# Patient Record
Sex: MALE | Race: WHITE | Employment: FULL TIME | URBAN - METROPOLITAN AREA
[De-identification: names, ages, dates, MRNs, and addresses within clinical notes are randomized per-mention and may not be internally consistent; named-entity substitution may affect disease eponyms.]

---

## 2018-04-23 PROBLEM — J45.20 MILD INTERMITTENT ASTHMA WITHOUT COMPLICATION: Status: ACTIVE | Noted: 2018-04-23

## 2018-04-23 PROBLEM — F41.1 GENERALIZED ANXIETY DISORDER: Status: ACTIVE | Noted: 2018-04-23

## 2018-04-23 PROBLEM — F41.0 PANIC ATTACKS: Status: ACTIVE | Noted: 2018-04-23

## 2018-04-23 PROBLEM — F33.1 MODERATE EPISODE OF RECURRENT MAJOR DEPRESSIVE DISORDER (HCC): Status: ACTIVE | Noted: 2018-04-23

## 2018-04-23 PROBLEM — J45.20 MILD INTERMITTENT ASTHMA WITHOUT COMPLICATION (HCC): Status: ACTIVE | Noted: 2018-04-23

## 2018-04-24 PROCEDURE — 82746 ASSAY OF FOLIC ACID SERUM: CPT | Performed by: PEDIATRICS

## 2018-04-24 PROCEDURE — 84480 ASSAY TRIIODOTHYRONINE (T3): CPT | Performed by: PEDIATRICS

## 2018-04-24 PROCEDURE — 82607 VITAMIN B-12: CPT | Performed by: PEDIATRICS

## 2024-07-12 ENCOUNTER — APPOINTMENT (OUTPATIENT)
Dept: GENERAL RADIOLOGY | Facility: HOSPITAL | Age: 54
End: 2024-07-12
Attending: EMERGENCY MEDICINE
Payer: COMMERCIAL

## 2024-07-12 ENCOUNTER — HOSPITAL ENCOUNTER (EMERGENCY)
Facility: HOSPITAL | Age: 54
Discharge: HOME | End: 2024-07-12
Attending: EMERGENCY MEDICINE
Payer: COMMERCIAL

## 2024-07-12 VITALS
DIASTOLIC BLOOD PRESSURE: 89 MMHG | HEIGHT: 68 IN | TEMPERATURE: 97 F | SYSTOLIC BLOOD PRESSURE: 142 MMHG | BODY MASS INDEX: 23.49 KG/M2 | WEIGHT: 155 LBS | RESPIRATION RATE: 22 BRPM | OXYGEN SATURATION: 98 % | HEART RATE: 65 BPM

## 2024-07-12 DIAGNOSIS — J04.0 ACUTE LARYNGITIS: Primary | ICD-10-CM

## 2024-07-12 LAB — S PYO AG THROAT QL: NEGATIVE

## 2024-07-12 PROCEDURE — 70360 X-RAY EXAM OF NECK: CPT | Performed by: EMERGENCY MEDICINE

## 2024-07-12 PROCEDURE — 99284 EMERGENCY DEPT VISIT MOD MDM: CPT

## 2024-07-12 PROCEDURE — 87880 STREP A ASSAY W/OPTIC: CPT

## 2024-07-12 PROCEDURE — 99283 EMERGENCY DEPT VISIT LOW MDM: CPT

## 2024-07-12 RX ORDER — LIDOCAINE HYDROCHLORIDE 20 MG/ML
5 SOLUTION OROPHARYNGEAL EVERY 6 HOURS PRN
Qty: 40 ML | Refills: 0 | Status: SHIPPED | OUTPATIENT
Start: 2024-07-12

## 2024-07-12 NOTE — ED INITIAL ASSESSMENT (HPI)
Covid since last Thursday, and for 2 days c/o trouble swallowing and subsequent trouble breathing. Vocalizing clearly, respirations unlabored, tolerating secretions, VSS

## 2024-07-12 NOTE — ED PROVIDER NOTES
Patient Seen in: [facility] Emergency Department      History     Chief Complaint   Patient presents with   • Covid     Stated Complaint: covid (says hes choking)    Subjective:   HPI    53-year-old male with COVID over a week ago improving who was given Paxlovid but then developed a rash and stopped it has had worsening throat pain slightly worse on the right than the left and some change in voice and pain and difficulty swallowing over the last few days.  No fever.  Cough and headache and myalgia as well as weakness symptoms have improved.    Objective:   No pertinent past medical history.            No pertinent past surgical history.              No pertinent social history.            Review of Systems    Positive for stated Chief Complaint: Covid    Other systems are as noted in HPI.  Constitutional and vital signs reviewed.      All other systems reviewed and negative except as noted above.    Physical Exam     ED Triage Vitals [07/12/24 0204]   BP (!) 145/92   Pulse 62   Resp 20   Temp 97.2 °F (36.2 °C)   Temp src    SpO2 99 %   O2 Device None (Room air)       Current Vitals:   Vital Signs  BP: (!) 145/92  Pulse: 62  Resp: 20  Temp: 97.2 °F (36.2 °C)  MAP (mmHg): (!) 110    Oxygen Therapy  SpO2: 99 %  O2 Device: None (Room air)            Physical Exam    Constitutional: Oriented to person, place, and time.  Appears well-developed. No distress.   Head: Normocephalic and atraumatic.   Eyes: Conjunctivae are normal. Pupils are equal, round, and reactive to light.   ENT: Posterior pharyngeal erythema.  No peritonsillar abscess or exudates.  Neck: Normal range of motion. Neck supple.  No significant lymphadenopathy.  No mass.  No stridor.  Slight hoarse voice.  Cardiovascular: Normal rate, regular rhythm and intact distal pulses.    Pulmonary/Chest: Effort normal. No respiratory distress.  No wheeze or crackles.  Musculoskeletal: Normal range of motion. No edema or tenderness.   Neurological: Alert and  oriented to person, place, and time.   Skin: Skin is warm and dry.   Nursing note and vitals reviewed.    Differential diagnosis includes laryngitis, postviral syndrome, less likely epiglottic process or retropharyngeal process.      ED Course     Labs Reviewed   POCT RAPID STREP - Normal               Neck soft tissue 2 view      IMPRESSION:    Patent upper airway  No epiglottitis    Faxed/finalized only at 709 hrs ET. If you are a member of the clinical care team and would like to discuss this case directly please call [phone] (ext [phone]).    Karlos Keating M.D.           [facility]                                         Medical Decision Making  Patient looks clinically well.  Given a dose of Decadron.  Will go on Lortab elixir.  Motrin at home for pain as well.  X-ray imaging reassuring.  Vital stable.  No indication for admission.  Will follow-up and come back with worsening or change.    Problems Addressed:  Acute laryngitis: acute illness or injury    Amount and/or Complexity of Data Reviewed  Labs: ordered. Decision-making details documented in ED Course.  Radiology: ordered and independent interpretation performed. Decision-making details documented in ED Course.     Details: By my gross review of the soft tissue neck x-ray I do not appreciate gross and obvious evidence of epiglottic enlargement or obvious retropharyngeal tissue enlargement    Risk  OTC drugs.  Prescription drug management.        Disposition and Plan     Clinical Impression:  1. Acute laryngitis         Disposition:  Discharge  7/12/2024  6:16 am    Follow-up:  Robinson Torres MD  [address] [place] [address]  [address] [place] [id number]  [phone]    Call today            Medications Prescribed:  Current Discharge Medication List        START taking these medications    Details   HYDROcodone-acetaminophen 7.5-325 MG/15ML Oral Solution Take 10 mL by mouth every 6 (six) hours as needed for Pain.  Qty: 80 mL, Refills: 0    Associated Diagnoses: Acute  laryngitis      Lidocaine Viscous HCl 2 % Mouth/Throat Solution Take 5 mL by mouth every 6 (six) hours as needed for Pain.  Qty: 40 mL, Refills: 0

## 2024-09-22 ENCOUNTER — HOSPITAL ENCOUNTER (INPATIENT)
Facility: HOSPITAL | Age: 54
LOS: 1 days | Discharge: HOME | End: 2024-09-24
Attending: EMERGENCY MEDICINE | Admitting: STUDENT IN AN ORGANIZED HEALTH CARE EDUCATION/TRAINING PROGRAM
Payer: COMMERCIAL

## 2024-09-22 ENCOUNTER — APPOINTMENT (OUTPATIENT)
Dept: CT IMAGING | Facility: HOSPITAL | Age: 54
End: 2024-09-22
Payer: COMMERCIAL

## 2024-09-22 ENCOUNTER — APPOINTMENT (OUTPATIENT)
Dept: CT IMAGING | Facility: HOSPITAL | Age: 54
End: 2024-09-22
Attending: EMERGENCY MEDICINE
Payer: COMMERCIAL

## 2024-09-22 DIAGNOSIS — F31.9 BIPOLAR 1 DISORDER (HCC): ICD-10-CM

## 2024-09-22 DIAGNOSIS — F41.9 ANXIETY: ICD-10-CM

## 2024-09-22 DIAGNOSIS — I63.9 ACUTE CVA (CEREBROVASCULAR ACCIDENT) (HCC): Primary | ICD-10-CM

## 2024-09-22 LAB
BASOPHILS # BLD AUTO: 0.03 X10(3) UL (ref 0–0.2)
BASOPHILS NFR BLD AUTO: 0.5 %
DEPRECATED RDW RBC AUTO: 39.8 FL (ref 35.1–46.3)
EOSINOPHIL # BLD AUTO: 0.16 X10(3) UL (ref 0–0.7)
EOSINOPHIL NFR BLD AUTO: 2.8 %
ERYTHROCYTE [DISTWIDTH] IN BLOOD BY AUTOMATED COUNT: 12 % (ref 11–15)
GLUCOSE BLDC GLUCOMTR-MCNC: 112 MG/DL (ref 70–99)
HCT VFR BLD AUTO: 42.7 %
HGB BLD-MCNC: 15.4 G/DL
IMM GRANULOCYTES # BLD AUTO: 0.01 X10(3) UL (ref 0–1)
IMM GRANULOCYTES NFR BLD: 0.2 %
LYMPHOCYTES # BLD AUTO: 2.89 X10(3) UL (ref 1–4)
LYMPHOCYTES NFR BLD AUTO: 51 %
MCH RBC QN AUTO: 33 PG (ref 26–34)
MCHC RBC AUTO-ENTMCNC: 36.1 G/DL (ref 31–37)
MCV RBC AUTO: 91.4 FL
MONOCYTES # BLD AUTO: 0.47 X10(3) UL (ref 0.1–1)
MONOCYTES NFR BLD AUTO: 8.3 %
NEUTROPHILS # BLD AUTO: 2.11 X10 (3) UL (ref 1.5–7.7)
NEUTROPHILS # BLD AUTO: 2.11 X10(3) UL (ref 1.5–7.7)
NEUTROPHILS NFR BLD AUTO: 37.2 %
PLATELET # BLD AUTO: 197 10(3)UL (ref 150–450)
RBC # BLD AUTO: 4.67 X10(6)UL
WBC # BLD AUTO: 5.7 X10(3) UL (ref 4–11)

## 2024-09-22 PROCEDURE — 70496 CT ANGIOGRAPHY HEAD: CPT | Performed by: EMERGENCY MEDICINE

## 2024-09-22 PROCEDURE — 70498 CT ANGIOGRAPHY NECK: CPT | Performed by: EMERGENCY MEDICINE

## 2024-09-22 PROCEDURE — 70450 CT HEAD/BRAIN W/O DYE: CPT | Performed by: EMERGENCY MEDICINE

## 2024-09-23 ENCOUNTER — APPOINTMENT (OUTPATIENT)
Dept: CT IMAGING | Facility: HOSPITAL | Age: 54
End: 2024-09-23
Attending: EMERGENCY MEDICINE
Payer: COMMERCIAL

## 2024-09-23 ENCOUNTER — APPOINTMENT (OUTPATIENT)
Dept: CV DIAGNOSTICS | Facility: HOSPITAL | Age: 54
End: 2024-09-23
Attending: HOSPITALIST
Payer: COMMERCIAL

## 2024-09-23 ENCOUNTER — APPOINTMENT (OUTPATIENT)
Dept: CT IMAGING | Facility: HOSPITAL | Age: 54
End: 2024-09-23
Attending: Other
Payer: COMMERCIAL

## 2024-09-23 ENCOUNTER — APPOINTMENT (OUTPATIENT)
Dept: MRI IMAGING | Facility: HOSPITAL | Age: 54
End: 2024-09-23
Attending: HOSPITALIST
Payer: COMMERCIAL

## 2024-09-23 PROBLEM — I63.9 ACUTE CVA (CEREBROVASCULAR ACCIDENT) (HCC): Status: ACTIVE | Noted: 2024-09-23

## 2024-09-23 LAB
ALBUMIN SERPL-MCNC: 4.6 G/DL (ref 3.2–4.8)
ALBUMIN/GLOB SERPL: 1.8 {RATIO} (ref 1–2)
ALP LIVER SERPL-CCNC: 67 U/L
ALT SERPL-CCNC: 17 U/L
ANION GAP SERPL CALC-SCNC: 7 MMOL/L (ref 0–18)
ANTIBODY SCREEN: NEGATIVE
APTT PPP: 29 SECONDS (ref 23–36)
AST SERPL-CCNC: 16 U/L (ref ?–34)
ATRIAL RATE: 71 BPM
BILIRUB SERPL-MCNC: 0.6 MG/DL (ref 0.3–1.2)
BUN BLD-MCNC: 15 MG/DL (ref 9–23)
BUN/CREAT SERPL: 9.5 (ref 10–20)
CALCIUM BLD-MCNC: 9.6 MG/DL (ref 8.7–10.4)
CHLORIDE SERPL-SCNC: 106 MMOL/L (ref 98–112)
CHOLEST SERPL-MCNC: 219 MG/DL (ref ?–200)
CO2 SERPL-SCNC: 28 MMOL/L (ref 21–32)
CREAT BLD-MCNC: 1.58 MG/DL
EGFRCR SERPLBLD CKD-EPI 2021: 51 ML/MIN/1.73M2 (ref 60–?)
EST. AVERAGE GLUCOSE BLD GHB EST-MCNC: 105 MG/DL (ref 68–126)
GLOBULIN PLAS-MCNC: 2.5 G/DL (ref 2–3.5)
GLUCOSE BLD-MCNC: 98 MG/DL (ref 70–99)
HBA1C MFR BLD: 5.3 % (ref ?–5.7)
HDLC SERPL-MCNC: 55 MG/DL (ref 40–59)
INR BLD: 0.96 (ref 0.8–1.2)
LDLC SERPL CALC-MCNC: 147 MG/DL (ref ?–100)
MAGNESIUM SERPL-MCNC: 1.9 MG/DL (ref 1.6–2.6)
NONHDLC SERPL-MCNC: 164 MG/DL (ref ?–130)
OSMOLALITY SERPL CALC.SUM OF ELEC: 293 MOSM/KG (ref 275–295)
P AXIS: 55 DEGREES
P-R INTERVAL: 148 MS
POTASSIUM SERPL-SCNC: 3.7 MMOL/L (ref 3.5–5.1)
PROT SERPL-MCNC: 7.1 G/DL (ref 5.7–8.2)
PROTHROMBIN TIME: 13.3 SECONDS (ref 11.6–14.8)
Q-T INTERVAL: 400 MS
QRS DURATION: 94 MS
QTC CALCULATION (BEZET): 434 MS
R AXIS: 10 DEGREES
RH BLOOD TYPE: NEGATIVE
RH BLOOD TYPE: NEGATIVE
SARS-COV-2 RNA RESP QL NAA+PROBE: NOT DETECTED
SODIUM SERPL-SCNC: 141 MMOL/L (ref 136–145)
T AXIS: 40 DEGREES
TRIGL SERPL-MCNC: 94 MG/DL (ref 30–149)
TROPONIN I SERPL HS-MCNC: 21 NG/L
VENTRICULAR RATE: 71 BPM
VLDLC SERPL CALC-MCNC: 18 MG/DL (ref 0–30)

## 2024-09-23 PROCEDURE — 93306 TTE W/DOPPLER COMPLETE: CPT | Performed by: HOSPITALIST

## 2024-09-23 PROCEDURE — 3E03317 INTRODUCTION OF OTHER THROMBOLYTIC INTO PERIPHERAL VEIN, PERCUTANEOUS APPROACH: ICD-10-PCS | Performed by: EMERGENCY MEDICINE

## 2024-09-23 PROCEDURE — 70450 CT HEAD/BRAIN W/O DYE: CPT | Performed by: EMERGENCY MEDICINE

## 2024-09-23 PROCEDURE — 70551 MRI BRAIN STEM W/O DYE: CPT | Performed by: HOSPITALIST

## 2024-09-23 RX ORDER — GUAIFENESIN 600 MG/1
600 TABLET, EXTENDED RELEASE ORAL DAILY
Status: ON HOLD | COMMUNITY
End: 2024-09-24 | Stop reason: CLARIF

## 2024-09-23 RX ORDER — ACETAMINOPHEN 325 MG/1
650 TABLET ORAL EVERY 4 HOURS PRN
Status: DISCONTINUED | OUTPATIENT
Start: 2024-09-23 | End: 2024-09-24

## 2024-09-23 RX ORDER — DIPHENHYDRAMINE HYDROCHLORIDE 50 MG/ML
50 INJECTION INTRAMUSCULAR; INTRAVENOUS ONCE AS NEEDED
Status: ACTIVE | OUTPATIENT
Start: 2024-09-23 | End: 2024-09-23

## 2024-09-23 RX ORDER — SODIUM CHLORIDE 9 MG/ML
INJECTION, SOLUTION INTRAVENOUS CONTINUOUS
Status: DISCONTINUED | OUTPATIENT
Start: 2024-09-23 | End: 2024-09-23

## 2024-09-23 RX ORDER — ATORVASTATIN CALCIUM 80 MG/1
80 TABLET, FILM COATED ORAL NIGHTLY
Status: DISCONTINUED | OUTPATIENT
Start: 2024-09-23 | End: 2024-09-24

## 2024-09-23 RX ORDER — METHYLPREDNISOLONE SODIUM SUCCINATE 125 MG/2ML
125 INJECTION, POWDER, LYOPHILIZED, FOR SOLUTION INTRAMUSCULAR; INTRAVENOUS ONCE AS NEEDED
Status: ACTIVE | OUTPATIENT
Start: 2024-09-23 | End: 2024-09-23

## 2024-09-23 RX ORDER — LABETALOL HYDROCHLORIDE 5 MG/ML
10 INJECTION, SOLUTION INTRAVENOUS EVERY 10 MIN PRN
Status: DISCONTINUED | OUTPATIENT
Start: 2024-09-23 | End: 2024-09-24

## 2024-09-23 RX ORDER — LAMOTRIGINE 200 MG/1
400 TABLET ORAL DAILY
Status: DISCONTINUED | OUTPATIENT
Start: 2024-09-23 | End: 2024-09-24

## 2024-09-23 RX ORDER — ONDANSETRON 2 MG/ML
4 INJECTION INTRAMUSCULAR; INTRAVENOUS EVERY 6 HOURS PRN
Status: DISCONTINUED | OUTPATIENT
Start: 2024-09-23 | End: 2024-09-24

## 2024-09-23 RX ORDER — FAMOTIDINE 10 MG/ML
20 INJECTION, SOLUTION INTRAVENOUS ONCE AS NEEDED
Status: ACTIVE | OUTPATIENT
Start: 2024-09-23 | End: 2024-09-23

## 2024-09-23 RX ORDER — POTASSIUM CHLORIDE 1500 MG/1
40 TABLET, EXTENDED RELEASE ORAL ONCE
Status: COMPLETED | OUTPATIENT
Start: 2024-09-23 | End: 2024-09-23

## 2024-09-23 NOTE — ED INITIAL ASSESSMENT (HPI)
Pt to ED with c/o left side numbness, fatigue, and left side facial droop , onset of symptoms at 11pm..Speech changes noted during evaluation.  MD Begum at bedside.      Pt states onset of left check twitching x3 days.

## 2024-09-23 NOTE — CONSULTS
[facility], [facility]    Huber CHRISS Alicea Patient Status:  Inpatient    1970 MRN [id number]   Location 234/234-A PCP Robinson Lopez MD     Date of Admission: 2024    History of Present Illness: Pt is a 54 year old male consulted for CC management s/p TNK with h/o bipolar diosrder and ADHD. Presented with onset of L facia droop and Larm weakness 2300 on . CT head negative for bleed and TNK given 0030 . Pt notes severe stress over the last week and notes weakness/malaise yesterday. Was very tired as well. Noted onset of L sided facial droop and weakness prompted ED eval.   Feels improved now but with chest tightness dissimilar to asthma. Notes some R facial tingling.   No antecdedent palpitations or chest pain.   Does admit to snoring and daytime sleepiness.   Does admit to reynauds as well, with swallow difficulty that prededed event and telangietasis.   PMH:    Past Medical History:    Asthma (HCC)    childhood    Bipolar 2 disorder (HCC)    Cubital tunnel syndrome    Heart murmur    History of Raynaud's syndrome    Low vitamin D level    profoundly low per patient       Past Surgical History:   Procedure Laterality Date    Other      cubital tunnel release    Other      L index finger gangion removal       Allergies:   Allergies   Allergen Reactions    Mold OTHER (SEE COMMENTS)    Penicillins OTHER (SEE COMMENTS)     Told by mother       Medications:  Outpatient Medications:    Current Facility-Administered Medications on File Prior to Encounter   Medication Dose Route Frequency Provider Last Rate Last Admin    [COMPLETED] dexamethasone (Decadron) tab 10 mg  10 mg Oral Once Nguyễn Rivera MD   10 mg at 24 0518     Current Outpatient Medications on File Prior to Encounter   Medication Sig Dispense Refill    guaiFENesin  MG Oral Tablet 12 Hr Take 1 tablet (600 mg total) by mouth daily.      lamoTRIgine 200 MG Oral Tab Take 2 tablets (400 mg total) by mouth  daily.      amphetamine-dextroamphetamine 20 MG Oral Tab Take 1 tablet by mouth daily.      Lidocaine Viscous HCl 2 % Mouth/Throat Solution Take 5 mL by mouth every 6 (six) hours as needed for Pain. 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    TB: none  Asbestos: none      Family History   Problem Relation Age of Onset    Stroke Father 67      REVIEW OF SYSTEMS:   A comprehensive 10 point review of systems was completed.  Pertinent positives and negatives noted in the the HPI.    OBJECTIVE:  Temp (24hrs), Av.9 °F (36.6 °C), Min:97.7 °F (36.5 °C), Max:98.1 °F (36.7 °C)   /78 (BP Location: Right arm)   Pulse 66   Temp 97.7 °F (36.5 °C) (Temporal)   Resp 21   Ht 172.7 cm (5' 7.99\")   Wt 161 lb 9.6 oz (73.3 kg)   SpO2 97%   BMI 24.58 kg/m²   O2: RA  General: NAD.   Neuro: Alert, no focal deficits.    HEENT: PERRL, class 3 airway.   Neck : No LAD  CV: RRR, nl S1, S2, no S4, S3 or murmur.   Lungs: Clear bilaterally.   Abd: Nontender, non distended.   Ext: No edema.   Skin: No rashes.     Labs:  Recent Labs   Lab 24  2350   WBC 5.7   HGB 15.4   HCT 42.7   .0     Recent Labs   Lab 24  2350   GLU 98   BUN 15   CREATSERUM 1.58*   CA 9.6      K 3.7      CO2 28.0   AST 16   ALT 17   ALB 4.6     Recent Labs   Lab 24  2350   INR 0.96   PTT 29.0     No results for input(s): \"ABGPHT\",  \"SNZGEB3N\", \"SYKFO5K\", \"ABGHCO3\", \"SITE\", \"DEV\", \"THGB\" in the last 168 hours.  Recent Labs   Lab 09/22/24  2350   TROPHS 21     No results for input(s): \"BNP\" in the last 168 hours.  COVID-19 Lab Results    COVID-19  Lab Results   Component Value Date    COVID19 Not Detected 09/23/2024    COVID19 NOT DETECTED 12/21/2021    COVID19 Not Detected 04/29/2020       Pro-Calcitonin  No results for input(s): \"PCT\" in the last 168 hours.    Cardiac  No results for input(s): \"TROP\", \"PBNP\" in the last 168 hours.    Creatinine Kinase  No results for input(s): \"CK\" in the last 168 hours.    Inflammatory Markers  No results for input(s): \"CRP\", \"GILBERTO\", \"LDH\", \"DDIMER\" in the last 168 hours.        Imaging:  CT head 9/22:  1.  No acute intracranial process.   2.  There are mild microvascular white matter ischemic changes, likely related to long-standing hypertension and/or diabetes.   3.  Atherosclerosis in the anterior and posterior circulations.     Assessment/Plan   CVA L sided weakness s/p TNK 9/23/2024  Unclear etiology but has sedentary job and telangietasis.   ?AVM  Echo with bubble study pending.   Neuro following.   Check MEIR and Anti SCL 70, ?CREST syndrome with esophageal issues.   Speech eval.   Tele  Neuro checks  Snoring  Suspect RICKIE  Outpt PSG especially with CVA.   Nutrtion Advance diet.   Proph  SCD. Lovenox when Ok with neuro.   Bipolar   Continue meds.   Dispo  Full code. ICU monitoring.     Critical Care Time greater than: 35 minutes    My best regards,         Nelson Pepe MD  [facility] Pulmonary, Critical Care and Sleep Medicine

## 2024-09-23 NOTE — PLAN OF CARE
Problem: HEMATOLOGIC - ADULT  Goal: Free from bleeding injury  Description: (Example usage: patient with low platelets)  INTERVENTIONS:  - Avoid intramuscular injections, enemas and rectal medication administration  - Ensure safe mobilization of patient  - Hold pressure on venipuncture sites to achieve adequate hemostasis  - Assess for signs and symptoms of internal bleeding  - Monitor lab trends  - Patient is to report abnormal signs of bleeding to staff  - Avoid use of toothpicks and dental floss  - Use electric shaver for shaving  - Use soft bristle tooth brush  - Limit straining and forceful nose blowing  Outcome: Progressing     Problem: Patient Centered Care  Goal: Patient preferences are identified and integrated in the patient's plan of care  Description: Interventions:  - What would you like us to know as we care for you? The patient normally has a low resting heart rate  - Provide timely, complete, and accurate information to patient/family  - Incorporate patient and family knowledge, values, beliefs, and cultural backgrounds into the planning and delivery of care  - Encourage patient/family to participate in care and decision-making at the level they choose  - Honor patient and family perspectives and choices  Outcome: Progressing     Problem: Patient/Family Goals  Goal: Patient/Family Long Term Goal  Description: Patient's Long Term Goal: Discharge home    Interventions:  - Medications as ordered  - Tests and procedures as ordered  - PT/OT as ordered  - SLP as ordered  - See additional Care Plan goals for specific interventions  Outcome: Progressing  Goal: Patient/Family Short Term Goal  Description: Patient's Short Term Goal: Increased Mobility    Interventions:   - Medications as ordered  - Tests and procedures as ordered  - PT/OT as ordered  - See additional Care Plan goals for specific interventions  Outcome: Progressing     Problem: METABOLIC/FLUID AND ELECTROLYTES - ADULT  Goal: Glucose  maintained within prescribed range  Description: INTERVENTIONS:  - Monitor Blood Glucose as ordered  - Assess for signs and symptoms of hyperglycemia and hypoglycemia  - Administer ordered medications to maintain glucose within target range  - Assess barriers to adequate nutritional intake and initiate nutrition consult as needed  - Instruct patient on self management of diabetes  Outcome: Progressing  Goal: Electrolytes maintained within normal limits  Description: INTERVENTIONS:  - Monitor labs and rhythm and assess patient for signs and symptoms of electrolyte imbalances  - Administer electrolyte replacement as ordered  - Monitor response to electrolyte replacements, including rhythm and repeat lab results as appropriate  - Fluid restriction as ordered  - Instruct patient on fluid and nutrition restrictions as appropriate  Outcome: Progressing  Goal: Hemodynamic stability and optimal renal function maintained  Description: INTERVENTIONS:  - Monitor labs and assess for signs and symptoms of volume excess or deficit  - Monitor intake, output and patient weight  - Monitor urine specific gravity, serum osmolarity and serum sodium as indicated or ordered  - Monitor response to interventions for patient's volume status, including labs, urine output, blood pressure (other measures as available)  - Encourage oral intake as appropriate  - Instruct patient on fluid and nutrition restrictions as appropriate  Outcome: Progressing     Problem: SKIN/TISSUE INTEGRITY - ADULT  Goal: Skin integrity remains intact  Description: INTERVENTIONS  - Assess and document risk factors for pressure ulcer development  - Assess and document skin integrity  - Monitor for areas of redness and/or skin breakdown  - Initiate interventions, skin care algorithm/standards of care as needed  Outcome: Progressing  Goal: Oral mucous membranes remain intact  Description: INTERVENTIONS  - Assess oral mucosa and hygiene practices  - Implement preventative  oral hygiene regimen  - Implement oral medicated treatments as ordered  Outcome: Progressing     Problem: NEUROLOGICAL - ADULT  Goal: Achieves stable or improved neurological status  Description: INTERVENTIONS  - Assess for and report changes in neurological status  - Initiate measures to prevent increased intracranial pressure  - Maintain blood pressure and fluid volume within ordered parameters to optimize cerebral perfusion and minimize risk of hemorrhage  - Monitor temperature, glucose, and sodium. Initiate appropriate interventions as ordered  Outcome: Progressing  Goal: Absence of seizures  Description: INTERVENTIONS  - Monitor for seizure activity  - Administer anti-seizure medications as ordered  - Monitor neurological status  Outcome: Progressing  Goal: Remains free of injury related to seizure activity  Description: INTERVENTIONS:  - Maintain airway, patient safety  and administer oxygen as ordered  - Monitor patient for seizure activity, document and report duration and description of seizure to MD/LIP  - If seizure occurs, turn patient to side and suction secretions as needed  - Reorient patient post seizure  - Seizure pads on all 4 side rails  - Instruct patient/family to notify RN of any seizure activity  - Instruct patient/family to call for assistance with activity based on assessment  Outcome: Progressing  Goal: Achieves maximal functionality and self care  Description: INTERVENTIONS  - Monitor swallowing and airway patency with patient fatigue and changes in neurological status  - Encourage and assist patient to increase activity and self care with guidance from PT/OT  - Encourage visually impaired, hearing impaired and aphasic patients to use assistive/communication devices  Outcome: Progressing     Problem: PAIN - ADULT  Goal: Verbalizes/displays adequate comfort level or patient's stated pain goal  Description: INTERVENTIONS:  - Encourage pt to monitor pain and request assistance  - Assess pain  using appropriate pain scale  - Administer analgesics based on type and severity of pain and evaluate response  - Implement non-pharmacological measures as appropriate and evaluate response  - Consider cultural and social influences on pain and pain management  - Manage/alleviate anxiety  - Utilize distraction and/or relaxation techniques  - Monitor for opioid side effects  - Notify MD/LIP if interventions unsuccessful or patient reports new pain  - Anticipate increased pain with activity and pre-medicate as appropriate  Outcome: Progressing     Problem: SAFETY ADULT - FALL  Goal: Free from fall injury  Description: INTERVENTIONS:  - Assess pt frequently for physical needs  - Identify cognitive and physical deficits and behaviors that affect risk of falls.  - [place] fall precautions as indicated by assessment.  - Educate pt/family on patient safety including physical limitations  - Instruct pt to call for assistance with activity based on assessment  - Modify environment to reduce risk of injury  - Provide assistive devices as appropriate  - Consider OT/PT consult to assist with strengthening/mobility  - Encourage toileting schedule  Outcome: Progressing     Problem: DISCHARGE PLANNING  Goal: Discharge to home or other facility with appropriate resources  Description: INTERVENTIONS:  - Identify barriers to discharge w/pt and caregiver  - Include patient/family/discharge partner in discharge planning  - Arrange for needed discharge resources and transportation as appropriate  - Identify discharge learning needs (meds, wound care, etc)  - Arrange for interpreters to assist at discharge as needed  - Consider post-discharge preferences of patient/family/discharge partner  - Complete POLST form as appropriate  - Assess patient's ability to be responsible for managing their own health  - Refer to Case Management Department for coordinating discharge planning if the patient needs post-hospital services based on  physician/LIP order or complex needs related to functional status, cognitive ability or social support system  Outcome: Progressing

## 2024-09-23 NOTE — ED QUICK NOTES
Delayed charting due to direct patient care  Covering for primary nurse.  MD at bedside for reevaluation. Pt now complaining of some numbness to face and tingling on the right side of his nose. MD placed order for repeat imaging. Transported with this RN and monitor to CT and back. Denies any arm or leg numbness or tingling.

## 2024-09-23 NOTE — SPIRITUAL CARE NOTE
Spiritual Care Visit Note    Patient Name: Huber Alicea Date of Spiritual Care Visit: 24   : 1970 Primary Dx: <principal problem not specified>       Referred By: Referral From: Nurse    Spiritual Care Taxonomy:    Intended Effects: Convey a calming presence    Methods: Offer spiritual/Religion support;Offer support;Encourage sharing of feelings    Interventions: Acknowledge current situation;Active listening;Ask guided questions;Discuss concerns;Explain  role;Identify supportive relationship(s);Silent prayer;Share words of hope and inspiration    Visit Type/Summary:     - Spiritual Care: Responded to a request for spiritual care and assessed the patient for spiritual care needs. Patient and family expressed appreciation for  visit. Provided information regarding how to contact Spiritual Care and left a Spiritual Care information card. Provided support for Patient's spiritual/Religion requests.  remains available for follow up.    Spiritual Care support can be requested via an Epic consult. For urgent/immediate needs, please contact the On Call  at: [place]: ext [phone]    Rev Annalsia Franco

## 2024-09-23 NOTE — CM/SW NOTE
09/23/24 1800   CM/SW Referral Data   Referral Source Physician   Reason for Referral Discharge planning   Informant Patient   Medical Hx   Does patient have an established PCP? Yes   Patient Info   Patient lives with Spouse/Significant other   Patient Status Prior to Admission   Independent with ADLs and Mobility Yes   Discharge Needs   Anticipated D/C needs No anticipated discharge needs     PT and OT saw patient today.  No HH recommendations.    PLAN:  [place] wife at [place].    Monika DE LEONN RN CRRN   RN Case Manager  [phone]

## 2024-09-23 NOTE — OCCUPATIONAL THERAPY NOTE
OCCUPATIONAL THERAPY EVALUATION - INPATIENT     Room Number: 234/234-A  Evaluation Date: 2024  Type of Evaluation: Quick Eval  Presenting Problem: L side n/t, L facial droop, expressive aphasia  CT brain : (-) for acute findings. MRI brain pending.  Hx bipolar, cubital tunnel syndrome     Physician Order: IP Consult to Occupational Therapy  Reason for Therapy: ADL/IADL Dysfunction and Discharge Planning    OCCUPATIONAL THERAPY ASSESSMENT   Patient is a 54 year old male admitted 2024 for L side n/t, L facial droop, expressive aphasia.  Prior to admission, patient's baseline is L side n/t, L facial droop, expressive aphasia.  Patient is currently functioning at baseline with  ADLs and fx mobility/transfers . Anticipate patient will be able to safely discharge home without continued skilled OT services when medically cleared.     PLAN  Patient has been evaluated and presents with no skilled Occupational Therapy needs  at this time.  Patient will be discharged from Occupational Therapy services. Please re-order if a new functional limitation presents during this admission.    OCCUPATIONAL THERAPY MEDICAL/SOCIAL HISTORY   Problem List  Principal Problem:    Acute CVA (cerebrovascular accident) ([facility])    HOME SITUATION  Type of Home: House  Home Layout: One level  Lives With: Spouse  Toilet and Equipment: Comfort height toilet  Shower/Tub and Equipment: Walk-in shower  Hand Dominance: Right  Drives: Yes  Patient Regularly Uses: Glasses  Stairs in Home: 4 FRANKLIN with railing  Use of Assistive Device(s): None    SUBJECTIVE  Patient agreeable to OT evaluation.    OCCUPATIONAL THERAPY EXAMINATION    OBJECTIVE  Precautions: Aspiration (bleeding precautions)  Fall Risk: Standard fall risk    WEIGHT BEARING RESTRICTION  None    PAIN ASSESSMENT  Ratin    O2 SATURATIONS  Oxygen Therapy  SPO2% on Oxygen at Rest: 99  At rest oxygen flow (liters per minute): 2  SPO2% Ambulation on Room Air: 99    COGNITION  Overall  Cognitive Status:  WFL - within functional limits    SENSATION  Patient reporting residual mild numbness in L face and neck    RANGE OF MOTION   Upper extremity ROM is within functional limits     STRENGTH ASSESSMENT  Upper extremity strength is within functional limits     COORDINATION  Gross Motor: WFL   Fine Motor: WFL     ACTIVITIES OF DAILY LIVING ASSESSMENT  AM-PAC ‘6-Clicks’ Inpatient Daily Activity Short Form  How much help from another person does the patient currently need…  -   Putting on and taking off regular lower body clothing?: None  -   Bathing (including washing, rinsing, drying)?: None  -   Toileting, which includes using toilet, bedpan or urinal? : None  -   Putting on and taking off regular upper body clothing?: None  -   Taking care of personal grooming such as brushing teeth?: None  -   Eating meals?: None    AM-PAC Score:  Score: 24  Approx Degree of Impairment: 0%  Standardized Score (AM-PAC Scale): 57.54  CMS Modifier (G-Code): CH    BED MOBILITY  Supine to Sit: Independent     FUNCTIONAL TRANSFER ASSESSMENT  Sit to Stand from EOB: Independent  Chair Transfer: Independent    FUNCTIONAL MOBILITY  Independent for community distance fx mobility without a device  Comments:  No LOB throughout.    FUNCTIONAL ADL ASSESSMENT  Eating: independent (per obs)   Grooming: independent standing at sink (per obs)   UB Dressing: independent (per obs)   LB Dressing: independent  Toileting: independent (per obs)     EDUCATION PROVIDED  Patient: Plan of Care; Role of Occupational Therapy; Discharge Recommendations  Patient's Response to Education: Verbalized Understanding    The patient's Approx Degree of Impairment: 0% has been calculated based on documentation in the [facility] '6 clicks' Inpatient Daily Activity Short Form.  Research supports that patients with this level of impairment may benefit from discharge home without skilled OT needs.  Final disposition will be made by interdisciplinary medical  team.    Patient End of Session: Up in chair;Call light within reach;Needs met;RN aware of session/findings;All patient questions and concerns addressed;Family present    Patient was able to achieve the following ...   Patient able to toilet transfer  safely and independently based on similar fx t/f's    Patient able to dress lower extremities  safely and independently    Patient/Caregiver able to demonstrate safety with ADLS  at previous functional level     Patient Evaluation Complexity Level:   Occupational Profile/Medical History LOW - Brief history including review of medical or therapy records    Specific performance deficits impacting engagement in ADL/IADL LOW  1 - 3 performance deficits    Client Assessment/Performance Deficits LOW - No comorbidities nor modifications of tasks    Clinical Decision Making LOW - Analysis of occupational profile, problem-focused assessments, limited treatment options    Overall Complexity LOW     OT Session Time  Therapeutic Activity: 16 minutes    OVI Flores/L  [facility]  #[id number]

## 2024-09-23 NOTE — PROGRESS NOTES
On call Nocturnist 09/23/24  Patient admitted to ICU after receiving tnk.  Presented with l facial droop and exp aphasia.  Symptoms resolved.  Currently awake, alert, oriented with no focal deficit noted.  Will cont to observe closely for any change in neuro status.  Currently bp 117/74.

## 2024-09-23 NOTE — ED PROVIDER NOTES
Patient Seen in: [facility] Emergency Department    History     Chief Complaint   Patient presents with    Numbness     Stated Complaint: L sided numbness, facial droop    HPI    54 year old male with h/o bipolar d/o, cubital tunnel syndrome complains of acute onset LUE and LLE weakness, L facial droop, numb/tingling to L lower face/LUE/LLE starting around 11pm while at rest. Onset of symptoms was 11pm. The patient did not awaken with symptoms. Current symptoms include extremity weakness, extremity numbness, facial droop, and inability to speak. Symptoms are currently unchanged. Stroke risk factors:  none known .  Prior stroke history: none. The patient is not on chronic anticoagulation. Associated symptoms: expressive aphasia since getting here. R hand dominant.    Past Medical History:    Asthma (HCC)    childhood    Bipolar 2 disorder (HCC)    Cubital tunnel syndrome    Heart murmur    History of Raynaud's syndrome    Low vitamin D level    profoundly low per patient       Past Surgical History:   Procedure Laterality Date    Other      cubital tunnel release    Other      L index finger gangion removal            Family History   Problem Relation Age of Onset    Stroke Father 67       Social History     Socioeconomic History    Marital status:    Tobacco Use    Smoking status: Never    Smokeless tobacco: Never   Substance and Sexual Activity    Alcohol use: Yes     Comment: very rare    Drug use: Yes     Types: Cannabis     Comment: very rare   Social History Narrative     with 2 children 7 and 10.           Review of Systems    Positive for stated complaint: L sided numbness, facial droop  Other systems are as noted in HPI.  Constitutional and vital signs reviewed.      All other systems reviewed and negative except as noted above.    PSFH elements reviewed from today and agreed except as otherwise stated in HPI.    Physical Exam     ED Triage Vitals   BP 09/22/24 2327  145/86   Pulse 09/22/24 2327 78   Resp 09/22/24 2327 18   Temp 09/22/24 2354 98.1 °F (36.7 °C)   Temp src 09/22/24 2354 Oral   SpO2 09/22/24 2327 100 %   O2 Device 09/22/24 2327 None (Room air)       Current:/74   Pulse 53   Temp 98.1 °F (36.7 °C) (Oral)   Resp 14   Ht 172.7 cm (5' 8\")   Wt 74.8 kg   SpO2 95%   BMI 25.09 kg/m²   PULSE OX 99% on RA which is normal  GENERAL: alert, appears anxious  HEAD: normocephalic, atraumatic,   EYES: PERRLA, EOMI,  THROAT: mmm dry, no lesions  NECK: supple, no meningeal signs  LUNGS: no resp distress, cta bilateral  CARDIO: RRR without murmur  GI: abdomen is soft and non tender, no masses, nl bowel sounds   EXTREMITIES: no edema,   NEURO:   CRANIAL NERVES: left lower facial droop at rest, on active facial muscle movement pt is able to smile equally   MOTOR: RUE/RLE - 5/5, LUE/LLE - 4/5 states feels heavier to move L side compared to R side   SENSORY:decreased sensation to light touch between LLE and RLE   CEREBELLAR: no pronator drift,  normal finger nose   VISUAL NEGLECT: none   LANGUAGE: intermittent expressive aphasia   LOC: A&O x3   APPROX NIH: 5  SKIN: good skin turgor, no  rashes  PSYCH: calm, cooperative,    Differential includes:  CVA vs. TIA vs. seizue/post ictal vs. neoplasm vs. tox or metabolic vs. infection    ED Course     Labs Reviewed   COMP METABOLIC PANEL (14) - Abnormal; Notable for the following components:       Result Value    Creatinine 1.58 (*)     BUN/CREA Ratio 9.5 (*)     eGFR-Cr 51 (*)     All other components within normal limits   POCT GLUCOSE - Abnormal; Notable for the following components:    POC Glucose  112 (*)     All other components within normal limits   PROTHROMBIN TIME (PT) - Normal   PTT, ACTIVATED - Normal   TROPONIN I HIGH SENSITIVITY - Normal   MAGNESIUM - Normal   RAPID SARS-COV-2 BY PCR - Normal   CBC WITH DIFFERENTIAL WITH PLATELET   TYPE AND SCREEN    Narrative:     The following orders were created for panel order Type  and screen.  Procedure                               Abnormality         Status                     ---------                               -----------         ------                     ABORH (Blood Type)[[id number]]                               Final result               Antibody Screen[[id number]]                                  Final result                 Please view results for these tests on the individual orders.   ABORH (BLOOD TYPE)   ANTIBODY SCREEN   ABORH CONFIRMATION     EKG    Rate, intervals and axes as noted on EKG Report.  Rate: 71  Rhythm: Sinus Rhythm  Reading: NSR           MDM       Pulse Ox: 95%, Normal, RA    Cardiac Monitor:   Pulse Readings from Last 1 Encounters:   09/23/24 53   , sinus, normal for rate and rhythm     Prior radiology reviewed: n/a    My independent radiology interpretation: CT Brain - no ICH , defer to radiology read for final report.    Radiology findings:     CT HEAD (without contrast):    Comparison: 9/22/2024     IMPRESSION: No acute intracranial hemorrhage. No evidence of intracranial mass, extra-axial fluid collection, or acute territorial infarct.     Visualized paranasal sinuses and mastoid air cells are clear.    Evidence of residual contrast from prior CTA.  --------------------------------------------------------------------------  CT HEAD (without contrast)  CTA HEAD/[place] OF PORTILLO   CTA NECK     Comparison: XR soft tissue neck 7/12/2024     IMPRESSION:  CT HEAD (without contrast)  No CT evidence of acute large territorial infarction at this time.    MRI could be used for more sensitive detection of hyperacute/acute infarction if clinically indicated.     No acute intracranial hemorrhage, mass or mass effect, or abnormal extra-axial fluid collections.     Chronic microvascular ischemic changes involving the deep and periventricular white matter.  Cerebral volume loss.   Intracranial atherosclerotic vascular calcification.          CTA HEAD  Patency of the  major intracranial arteries.   No evidence of large vessel occlusion or identified aneurysm.  Evaluation for stenoses and peripheral occlusions is somewhat limited by technical factors.  Question stenosis of left anterior cerebral artery A2 segment.     CTA NECK  Patency of the bilateral common carotid arteries, internal carotid arteries, and vertebral arteries.    No evidence of carotid or vertebral artery occlusion, hemodynamically significant stenosis, or dissection.     Degenerative changes of the cervical spine.  ---------------------------------------------------------------    Critical Care: I spent a total of 50 minutes of critical care time in obtaining history, performing a physical exam, bedside monitoring of interventions, collecting and interpreting tests and discussion with consultants but not including time spent performing procedures.    Medications   sodium chloride 0.9% infusion ( Intravenous New Bag 9/23/24 0024)   acetaminophen (Tylenol) rectal suppository 650 mg (has no administration in time range)   labetalol (Trandate) 5 mg/mL injection 10 mg (has no administration in time range)   niCARdipine in sodium chloride 0.86% (carDENE) 20 mg/200mL infusion premix (has no administration in time range)   methylPREDNISolone sodium succinate (Solu-MEDROL) injection 125 mg (has no administration in time range)     And   diphenhydrAMINE (Benadryl) 50 mg/mL  injection 50 mg (has no administration in time range)     And   famotidine (Pepcid) 20 mg/2mL injection 20 mg (has no administration in time range)   iopamidol 76% (ISOVUE-370) injection for power injector (80 mL Intravenous Given 9/22/24 2347)   tenecteplase (TNKase) injection 19 mg (19 mg Intravenous Given 9/23/24 0030)         NIH Stroke Scale: 5    http://www.mdcalc.com/nih-stroke-scale-score-nihss/    TPA Discussion: I had a long discussion about the benefits and possible risks of TNK with the patient and his wife at bedside.  Overall decision was  made that these are fairly large deficits and that the patient would like to pursue possible therapy.  His CTA came back with read of no large vessel occlusion, not a neurointerventional candidate.      D/w Dr Francisco Duron - agrees with TNK, will consult    Patient treated for CVA, doing well after TNK administration, feels symptoms are improving.  I was called back in the patient room when he started to describe an odd sensation of paresthesias spreading from the left side across the right frontal area and head with mild frontal headache.  For this reason a repeat CT of the brain was performed to rule out any signs of intracranial hemorrhage and it is normal.  Otherwise patient now has 5/5 muscle strength to bilateral upper and lower extremities.    Disposition and Plan     Clinical Impression:  1. Acute CVA (cerebrovascular accident) (HCC)        Disposition:  Admit    Follow-up:  No follow-up provider specified.    Medications Prescribed:  Current Discharge Medication List          Hospital Problems       Present on Admission  Date Reviewed: 12/21/2021            ICD-10-CM Noted POA    * (Principal) Acute CVA (cerebrovascular accident) (HCC) I63.9 9/23/2024 Unknown

## 2024-09-23 NOTE — SLP NOTE
ADULT SWALLOWING EVALUATION    ASSESSMENT    ASSESSMENT/OVERALL IMPRESSION:  PPE REQUIRED. THIS THERAPIST WORE GLOVES FOR DURATION OF EVALUATION. HANDS WASHED UPON ENTRANCE/EXIT.    Per MD H&P, \"Mr. Alicea is a 54 year old male with PMH sig for Bipolar DO, ADHD, who presents with left sided facial droop, and left arm and leg weakness.  Patient states around 11:15 last night he developed left sided facial droop and numbness, slurred speech, left arm and leg numbness/weakness.  He immediately came to the hospital, he states prior to this he felt tired, during the weekend, no HA or vision changes, did have a lot of stress this weak, no other complaints.  Denies CP or sob, no fevers, or chills, no HA or vision changes, no nausea or vomiting, states his speech and left sided symptoms are much improved, he continues to have some numbness of his face but other wise improved.\"    SLP BSSE orders received and acknowledged. A swallow evaluation warranted per RN dysphagia screen/stroke protocol. Pt afebrile with clear vocal quality, on room air, with oxygen saturation at 98%. Pt with no hx of dysphagia at [facility].   Pt positioned 90 degrees in bed, alert/cooperative/visitor present. Pt with no complaints of pain. Oral motor skills within functional limits. Pt presented with trials of hard solids and thin liquids via straw. Pt with adequate oral acceptance and bilabial seal across all trials. Pt with intact bite, mastication of solids, and timely A-P transit. Pt's swallow response appears timely with adequate hyolaryngeal elevation/excursion. No clinical signs of aspiration (e.g., immediate/delayed throat clear, immediate/delayed cough, wet vocal quality, increased O2 effort) observed across all trials. No CXR on this admission. Oxygen status remained stable t/o the entire evaluation.     At this time, pt presents with functional oral and probable pharyngeal swallow stages. Recommend a regular diet and thin liquids with strict  adherence to safe swallowing compensatory strategies. Results and recommendations reviewed with RN, pt, and family. Pt/pt's family v/u to all results/recommendations. Recommendations remain written on whiteboard. SLP collaborated with RN for MD diet orders.     PLAN: SLP to f/u x1 meal assessments, monitor imaging, and VFSS if clinically indicated       RECOMMENDATIONS   Diet Recommendations - Solids: Regular  Diet Recommendations - Liquids: Thin Liquids                    Compensatory Strategies Recommended: Slow rate  Aspiration Precautions: Upright position;Slow rate  Medication Administration Recommendations:  (as tolerated)  Treatment Plan/Recommendations: Communication evaluation;Aspiration precautions (pending MRI results)    HISTORY   MEDICAL HISTORY  Reason for Referral: RN dysphagia screen;Stroke protocol    Problem List  Principal Problem:    Acute CVA (cerebrovascular accident) (HCC)      Past Medical History  Past Medical History:    Asthma (HCC)    childhood    Bipolar 2 disorder (HCC)    Cubital tunnel syndrome    Heart murmur    History of Raynaud's syndrome    Low vitamin D level    profoundly low per patient       Prior Living Situation: Home with support  Diet Prior to Admission: Regular;Thin liquids  Precautions: Aspiration    Patient/Family Goals: did not state    SWALLOWING HISTORY  Current Diet Consistency: NPO  Dysphagia History: none  Imaging Results: 9/23 CT BRAIN  CONCLUSION: No acute intracranial process.     SUBJECTIVE       OBJECTIVE   ORAL MOTOR EXAMINATION  Dentition: Functional  Symmetry: Within Functional Limits  Strength: Within Functional Limits  Tone: Within Functional Limits  Range of Motion: Within Functional Limits  Rate of Motion: Within Functional Limits    Voice Quality: Clear  Respiratory Status: Unlabored  Consistencies Trialed: Thin liquids;Hard solid  Method of Presentation: Self presentation;Straw  Patient Positioning: Upright;Midline    Oral Phase of Swallow: Within  Functional Limits                    Pharyngeal Phase of Swallow: Within Functional Limits           (Please note: Silent aspiration cannot be evaluated clinically. Videofluoroscopic Swallow Study is required to rule-out silent aspiration.)    Esophageal Phase of Swallow: No complaints consistent with possible esophageal involvement  Comments: NA            GOALS  Goal #1 The patient will tolerate regular consistency and thin liquids without overt signs or symptoms of aspiration with 100 % accuracy over 1 session(s).  In Progress   Goal #2 The patient/family/caregiver will demonstrate understanding and implementation of aspiration precautions and swallow strategies independently over 1 session(s).    In Progress     FOLLOW UP  Treatment Plan/Recommendations: Communication evaluation;Aspiration precautions (pending MRI results)  Number of Visits to Meet Established Goals: 1  Follow Up Needed (Documentation Required): Yes  SLP Follow-up Date: 09/24/24    Thank you for your referral.   If you have any questions, please contact KARL Soto M.S. CCC-SLP  Speech Language Pathologist  Phone Number [phone]

## 2024-09-23 NOTE — PHYSICAL THERAPY NOTE
PHYSICAL THERAPY EVALUATION - INPATIENT     Room Number: 234/234-A  Evaluation Date: 2024  Type of Evaluation: Initial   Physician Order: PT Eval and Treat    Presenting Problem: acute CVA     Reason for Therapy: Mobility Dysfunction and Discharge Planning    PHYSICAL THERAPY ASSESSMENT   Patient is a 54 year old male admitted 2024 for acute CVA.  Prior to admission, patient's baseline is independent in ADL's and ambulation with no assistive device.  Patient is currently functioning at baseline with bed mobility, transfers, gait, and stair negotiation.  Patient is requiring independent as a result of the following impairments: medical status.      Patient will benefit from continued skilled PT Services with no additional skilled services but increased support at home.    PHYSICAL THERAPY MEDICAL/SOCIAL HISTORY   Problem List  Principal Problem:    Acute CVA (cerebrovascular accident) (HCC)    HOME SITUATION  Home Situation  Type of Home: House  Home Layout: One level  Stairs to Enter : 4  Railing: Yes  Stairs to Bedroom: 0  Railing: No  Lives With: Spouse  Drives: Yes  Patient Owned Equipment: None  Patient Regularly Uses: Reading glasses;Glasses     SUBJECTIVE  \"I have like this weakness in my left leg\"    PHYSICAL THERAPY EXAMINATION   OBJECTIVE  Precautions: None  Fall Risk: Standard fall risk    WEIGHT BEARING RESTRICTION  Weight Bearing Restriction: None                PAIN ASSESSMENT  Ratin          COGNITION  Overall Cognitive Status:  WFL - within functional limits    RANGE OF MOTION AND STRENGTH ASSESSMENT  Lower extremity ROM is within functional limits  BLE WNL  Lower extremity strength is within functional limits  BLE WNL    BALANCE  Static Sitting: Good  Dynamic Sitting: Good  Static Standing: Good  Dynamic Standing: Fair +    AM-PAC '6-Clicks' INPATIENT SHORT FORM - BASIC MOBILITY  How much difficulty does the patient currently have...  Patient Difficulty: Turning over in bed (including  adjusting bedclothes, sheets and blankets)?: None   Patient Difficulty: Sitting down on and standing up from a chair with arms (e.g., wheelchair, bedside commode, etc.): None   Patient Difficulty: Moving from lying on back to sitting on the side of the bed?: None   How much help from another person does the patient currently need...   Help from Another: Moving to and from a bed to a chair (including a wheelchair)?: A Little   Help from Another: Need to walk in hospital room?: A Little   Help from Another: Climbing 3-5 steps with a railing?: A Little     AM-PAC Score:  Raw Score: 21   Approx Degree of Impairment: 28.97%   Standardized Score (AM-PAC Scale): 50.25   CMS Modifier (G-Code):     FUNCTIONAL ABILITY STATUS  Functional Mobility/Gait Assessment  Gait Assistance: Independent  Distance (ft): 100ft  Assistive Device: None  Rolling:  not tested   Supine to Sit: independent  Sit to Supine:  not tested   Sit to Stand: independent    Exercise/Education Provided:  Bed mobility  Body mechanics  Gait training  Transfer training    Skilled Therapy Provided: Patient on room air, oxygen sat 98% and heart rate 60, blood pressure 119/80. Patient currently independent in mobility during the session with no assistive device. Patient able to navigate 4 stairs with one handrail with alternating pattern when ascending and single step pattern when descending, SBA. Patient reports left facial numbness and neck numbness. Oxygen sat 97% and heart rate 64 at end of session. The patient's Approx Degree of Impairment: 28.97% has been calculated based on documentation in the [facility] '6 clicks' Inpatient Basic Mobility Short Form.  Research supports that patients with this level of impairment may benefit from home. Patient received semi-fowlers in bed, agreeable to physical therapy evaluation. Education with patient provided verbally on physical therapy plan of care and physiological benefits of out of bed mobility. Patient history  and/or personal factors that may impact the plan of care include home accessibility concerns. Based on the physical therapy examination of the noted systems and functional activity/participation limitations, the patient presentation is stable given the patient demonstrates no significant barriers to meeting therapy goals. Final disposition will be made by interdisciplinary medical team.    Patient End of Session: Up in chair;Needs met;Call light within reach;RN aware of session/findings;All patient questions and concerns addressed;Family present    Patient Evaluation Complexity Level:  History Low - no personal factors and/or co-morbidities   Examination of body systems Low -  addressing 1-2 elements   Clinical Presentation Low- Stable   Clinical Decision Making  Low Complexity     Gait Training: 10 minutes  Therapeutic Activity:  13 minutes

## 2024-09-23 NOTE — ED QUICK NOTES
Pt A&Ox4. Pt states numbness to left side improving. Sensation improved and intact. Facial droop improved. VSS. No bleeding noted. Pt states mild headache, states he does not need medication at this time.

## 2024-09-23 NOTE — H&P
[facility] Hospitalist H&P       CC:   Chief Complaint   Patient presents with    Numbness        PCP: Robinson Lopez MD    History of Present Illness: Mr. Alicea is a 54 year old male with PMH sig for Bipolar DO, ADHD, who presents with left sided facial droop, and left arm and leg weakness.  Patient states around 11:15 last night he developed left sided facial droop and numbness, slurred speech, left arm and leg numbness/weakness.  He immediately came to the hospital, he states prior to this he felt tired, during the weekend, no HA or vision changes, did have a lot of stress this weak, no other complaints.  Denies CP or sob, no fevers, or chills, no HA or vision changes, no nausea or vomiting, states his speech and left sided symptoms are much improved, he continues to have some numbness of his face but other wise improved.        PMH  Past Medical History:    Asthma (HCC)    childhood    Bipolar 2 disorder (HCC)    Cubital tunnel syndrome    Heart murmur    History of Raynaud's syndrome    Low vitamin D level    profoundly low per patient        PSH  Past Surgical History:   Procedure Laterality Date    Other      cubital tunnel release    Other      L index finger gangion removal        ALL:  Allergies   Allergen Reactions    Mold OTHER (SEE COMMENTS)    Penicillins OTHER (SEE COMMENTS)     Told by mother        Home Medications:  No outpatient medications have been marked as taking for the 9/22/24 encounter (Hospital Encounter).         Soc Hx  Social History     Tobacco Use    Smoking status: Never    Smokeless tobacco: Never   Substance Use Topics    Alcohol use: Yes     Comment: very rare        Fam Hx  Family History   Problem Relation Age of Onset    Stroke Father 67       Review of Systems  Comprehensive ROS reviewed and negative except for what's stated above.     OBJECTIVE:  BP 97/66 (BP Location: Right arm)   Pulse 53   Temp 97.7 °F (36.5 °C) (Temporal)   Resp 14   Ht 5' 7.99\"  (1.727 m)   Wt 161 lb 9.6 oz (73.3 kg)   SpO2 93%   BMI 24.58 kg/m²   General:  Alert, no distress   Head:  Normocephalic, without obvious abnormality, atraumatic.   Eyes:  Sclera anicteric, No conjunctival pallor    Nose: Nares normal. Septum midline. Mucosa normal. No drainage.   Throat: Lips, mucosa, and tongue normal. Teeth and gums normal.   Neck: Supple, symmetrical, trachea midline, no cervical or supraclavicular lymph adenopathy, thyroid: no enlargment/tenderness/nodules appreciated   Lungs:   Clear to auscultation bilaterally. Normal effort   Chest wall:  No tenderness or deformity.   Heart:  Regular rate and rhythm, S1, S2 normal, no murmur, rub or gallop appreciated   Abdomen:   Soft, non-tender. Bowel sounds normal. No masses,  No organomegaly. Non distended   Extremities: Extremities normal, atraumatic, no cyanosis or edema.   Skin: Skin color, texture, turgor normal. No rashes or lesions.    Neurologic: Normal strength, no focal deficit appreciated, 5/5 strength bilaterally, speech normal, no facial droop, diminished sensation to left face compared to right.       Diagnostic Data:    CBC/Chem  Recent Labs   Lab 09/22/24  2350   WBC 5.7   HGB 15.4   MCV 91.4   .0   INR 0.96       Recent Labs   Lab 09/22/24  2350      K 3.7      CO2 28.0   BUN 15   CREATSERUM 1.58*   GLU 98   CA 9.6   MG 1.9       Recent Labs   Lab 09/22/24  2350   ALT 17   AST 16   ALB 4.6       No results for input(s): \"TROP\" in the last 168 hours.       Radiology: CT BRAIN OR HEAD (CPT=70450)    Result Date: 9/23/2024  CONCLUSION: No acute intracranial process.   [facility] provided a preliminary report for this examination. This final report agrees with their preliminary findings.   Dictated by (CST): Ermias Caputo MD on 9/23/2024 at 6:46 AM     Finalized by (CST): Ermias Caputo MD on 9/23/2024 at 6:47 AM             ASSESSMENT / PLAN:   Mr. Alicea is a 54 year old male with PMH sig for Bipolar DO, ADHD,  who presents with left sided facial droop, and left arm and leg weakness.    Acute ischemic stroke  Left sided weakness/left sided facial droop  - s/p TNK given in ER  - symptoms improved  - Neurology, pulm consulted  - follow TNK protocol  - no focal weakness now  - needs tele, MRI, ECHO with bubble  - check A1c, Lipids  - further work up per neuro    Bipolar DO  ADHD  - resume lamictal  - hold aldderal    FN:  - IVF:75  - Diet: NPO, adv per neuro/speech    DVT Prophy: scd  Lines: PIV    Dispo: pending clinical course    Outpatient records or previous hospital records reviewed.     Further recommendations pending patient's clinical course.  [facility] hospitalist to continue to follow patient while in house    Patient and/or patient's family given opportunity to ask questions and note understanding and agreeing with therapeutic plan as outlined    Thank You,  Rhett Carroll MD    Hospitalist with [facility] Service number: [phone]

## 2024-09-23 NOTE — CONSULTS
[facility] NEUROSCIENCES [address], [address]  [facility] [address]  [phone]            Huber Alicea Patient Status:  Inpatient    1970 MRN [id number]   Location [facility] 2W/SW Attending Rhett Carroll MD   Hosp Day # 0 PCP Robinson Lopez MD     Date of Admission:  2024  Date of Consult:  2024  Reason for Consultation:   Stroke    History of Present Illness:   Patient is a 54 year old male who was admitted to the hospital for Acute CVA (cerebrovascular accident) (HCC):    I am seeing Mr. Davis today in consultation with Dr. Carroll for further evaluation and management of transient left-sided facial droop, and left upper and lower extremity weakness.  He told me that around 11:15 PM last night he developed left-sided facial droop and numbness, his speech did not sound right and he could not find words, and he developed numbness and weakness in left upper left lower and lower extremities.  He has improved after TNK was administered yesterday upon arrival to the emergency department, he continues to have residual numbness on the left side of his body.  CT scan of the head upon arrival to the emergency department did not reveal any acute intracranial abnormalities.  CT angiogram of the head and neck obtained in the emergency department revealed focal high-grade stenosis versus tortuosity of the left anterior cerebral artery at A2.  There were no high-grade stenosis anywhere else.   His past medical history significant for bipolar disorder type 2.   He told me his father had a stroke and  from it at age 60.      Past Medical History  Past Medical History:    Asthma ([facility])    childhood    Bipolar 2 disorder ([facility])    Cubital tunnel syndrome    Heart murmur    History of Raynaud's syndrome    Low vitamin D level    profoundly low per patient       Past Surgical History  Past Surgical History:   Procedure Laterality Date    Other      cubital tunnel release     Other      L index finger gangion removal       Family History  Family History   Problem Relation Age of Onset    Stroke Father 67       Social History  Pediatric History   Patient Parents    Not on file     Other Topics Concern    Not on file   Social History Narrative     with 2 children 7 and 10.               Current Medications:  Current Facility-Administered Medications   Medication Dose Route Frequency    acetaminophen (Tylenol) rectal suppository 650 mg  650 mg Rectal Q4H PRN    labetalol (Trandate) 5 mg/mL injection 10 mg  10 mg Intravenous Q10 Min PRN    niCARdipine in sodium chloride 0.86% (carDENE) 20 mg/200mL infusion premix  5-15 mg/hr Intravenous Continuous PRN    methylPREDNISolone sodium succinate (Solu-MEDROL) injection 125 mg  125 mg Intravenous Once PRN    And    diphenhydrAMINE (Benadryl) 50 mg/mL  injection 50 mg  50 mg Intravenous Once PRN    And    famotidine (Pepcid) 20 mg/2mL injection 20 mg  20 mg Intravenous Once PRN    lamoTRIgine (LaMICtal) tab 400 mg  400 mg Oral Daily    sodium chloride 0.9% infusion   Intravenous Continuous    acetaminophen (Tylenol) tab 650 mg  650 mg Oral Q4H PRN    Or    acetaminophen (Tylenol) rectal suppository 650 mg  650 mg Rectal Q4H PRN    ondansetron (Zofran) 4 MG/2ML injection 4 mg  4 mg Intravenous Q6H PRN    atorvastatin (Lipitor) tab 80 mg  80 mg Oral Nightly     Medications Prior to Admission   Medication Sig    guaiFENesin  MG Oral Tablet 12 Hr Take 1 tablet (600 mg total) by mouth daily.    lamoTRIgine 200 MG Oral Tab Take 2 tablets (400 mg total) by mouth daily.    amphetamine-dextroamphetamine 20 MG Oral Tab Take 1 tablet by mouth daily.    Lidocaine Viscous HCl 2 % Mouth/Throat Solution Take 5 mL by mouth every 6 (six) hours as needed for Pain.       Allergies  Allergies   Allergen Reactions    Mold OTHER (SEE COMMENTS)    Penicillins OTHER (SEE COMMENTS)     Told by mother       Review of Systems:   As in HPI,  the rest of the 14 system review was done and was negative    Physical Exam:     Vitals:    09/23/24 0900 09/23/24 1000 09/23/24 1100 09/23/24 1200   BP: 114/73 103/79 119/80 118/78   Pulse: 54 52 61 64   Resp: 11 13 12 13   Temp:    98.4 °F (36.9 °C)   TempSrc:    Temporal   SpO2: 96% 98% 99% 98%   Weight:       Height:           General: No apparent distress, well nourished, well groomed.  Head- Normocephalic, atraumatic  Eyes- No redness or swelling  ENT- Hearing intake, smell preserved, normal glutition  Neck- No masses or adenopathy  Cv: pulses were palpable and normal, no cyanosis or edema     Neurological:     Mental Status- Alert and oriented x3.  Normal attention span and concentration  Thought process intact  Memory intact- recent and remote  Mood intact  Fund of knowledge appropriate for education and age    Language intact including: comprehension, naming, repetition, vocabulary    Cranial Nerves:  II.- Visual fields full to confrontation        Fundoscopically- No papilledema or retinal hemorrhages. Normal optic discs, sharp edges.  III, IV, VI- EOM intact, EDWIN  V. Facial sensation intact  VII. Face symmetric, no facial weakness  VIII. Hearing intact.  IX. Palate elevates symmetrically.  XI. Shoulder shrug is intact  XII. Tongue is midline    Motor Exam:  Muscle tone normal  No atrophy or fasciculations  Strength- upper extremities 5/5 proximally and distally                  - lower  extremities 5/5 proximally and distally    Sensory Exam:  Decreased sensation on the left side of the face compared to the right side, interestingly, he splits on the forehead tuning fork swing test.    Deep Tendon Reflexes:  2+ and symmetric  No clonus  No Babinski sign    Coordination:  Finger to nose intact      Gait:  Normal gait    Results:     Laboratory Data:  Lab Results   Component Value Date    WBC 5.7 09/22/2024    HGB 15.4 09/22/2024    HCT 42.7 09/22/2024    .0 09/22/2024    CREATSERUM 1.58 (H)  09/22/2024    BUN 15 09/22/2024     09/22/2024    K 3.7 09/22/2024     09/22/2024    CO2 28.0 09/22/2024    GLU 98 09/22/2024    CA 9.6 09/22/2024    ALB 4.6 09/22/2024    ALKPHO 67 09/22/2024    TP 7.1 09/22/2024    AST 16 09/22/2024    ALT 17 09/22/2024    PTT 29.0 09/22/2024    INR 0.96 09/22/2024    PTP 13.3 09/22/2024    T4F 1.44 04/24/2018    TSH 0.881 12/21/2021    PSA 0.606 05/27/2021    DDIMER <0.19 12/21/2021    ESRML 1 04/24/2018    CRP 0.10 04/24/2018    MG 1.9 09/22/2024    TROP <0.300 12/21/2021    B12 673 04/24/2018         Imaging:    CT STROKE CTA BRAIN/CTA NECK (W IV)(CPT=70496/60969)    Result Date: 9/23/2024  CONCLUSION:  1. CTA head:  Focal high-grade stenosis versus tortuous segment the left anterior cerebral artery left A2 segment.  Otherwise, no high-grade stenosis, occlusion, or gross aneurysm in the anterior or posterior circulation. 2. CTA neck: No stenosis, occlusion, or dissection of the carotids or vertebrals.   [facility] provided a preliminary report for this examination. This final report agrees with their preliminary findings.   Dictated by (CST): Ermias Caputo MD on 9/23/2024 at 9:12 AM     Finalized by (CST): Ermias Caputo MD on 9/23/2024 at 9:15 AM          CT STROKE BRAIN (NO IV)(CPT=70450)    Result Date: 9/23/2024  CONCLUSION:  1.  No acute intracranial process. 2.  There are mild microvascular white matter ischemic changes, likely related to long-standing hypertension and/or diabetes. 3.  Atherosclerosis in the anterior and posterior circulations.  [facility] Dr Rico discussed the above findings with Dr. Begum on 9/23/24 12:51 a.m. Eastern time.   Dictated by (CST): Ermias Caputo MD on 9/23/2024 at 9:02 AM     Finalized by (CST): Ermias Caputo MD on 9/23/2024 at 9:09 AM          CT BRAIN OR HEAD (CPT=70450)    Result Date: 9/23/2024  CONCLUSION: No acute intracranial process.   [facility] provided a preliminary report for this examination.  This final report agrees with their preliminary findings.   Dictated by (CST): Ermias Caputo MD on 9/23/2024 at 6:46 AM     Finalized by (CST): Ermias Caputo MD on 9/23/2024 at 6:47 AM         EKG    Result Date: 9/23/2024  Normal sinus rhythm Normal ECG No previous ECGs found in [facility]       Impression:    R/O Acute CVA (cerebrovascular accident) (HCC)    I am not confident he had an ischemic stroke in the right hemisphere.  Some sensory features on his exam today were inconsistent.  He is not weak.   CT angiogram of the head and neck revealed tortuosity/stenosis of the left anterior cerebral artery at the level of A2  MRI brain is still pending-Patient admitted on 09/22      Recommendations:  1-MRI brain  2-echocardiogram  3-A1c 5.3  4 LDL- 147  5-chemical DVT prophylaxis  6-aspirin 81 mg 24 hours post TNK  7- PT/OT/ST  8-Atorvastatin 80 mg     Will follow results peripherally      Thank you for allowing me to participate in the care of your patient.    Cindy Jaimes MD

## 2024-09-23 NOTE — ED QUICK NOTES
Patient complaints of left sided numbness from face to leg. Left facial droop. [facility] alert called [phone]

## 2024-09-23 NOTE — PLAN OF CARE
Patient alert and oriented, No neurological deficits noted.  Passed ST/PT/OT, ambulates well in room.  MRI done today, plan for frequent neuro checks and follow up imaging.  Family at bedside.  Problem: HEMATOLOGIC - ADULT  Goal: Free from bleeding injury  Description: (Example usage: patient with low platelets)  INTERVENTIONS:  - Avoid intramuscular injections, enemas and rectal medication administration  - Ensure safe mobilization of patient  - Hold pressure on venipuncture sites to achieve adequate hemostasis  - Assess for signs and symptoms of internal bleeding  - Monitor lab trends  - Patient is to report abnormal signs of bleeding to staff  - Avoid use of toothpicks and dental floss  - Use electric shaver for shaving  - Use soft bristle tooth brush  - Limit straining and forceful nose blowing  Outcome: Progressing     Problem: METABOLIC/FLUID AND ELECTROLYTES - ADULT  Goal: Glucose maintained within prescribed range  Description: INTERVENTIONS:  - Monitor Blood Glucose as ordered  - Assess for signs and symptoms of hyperglycemia and hypoglycemia  - Administer ordered medications to maintain glucose within target range  - Assess barriers to adequate nutritional intake and initiate nutrition consult as needed  - Instruct patient on self management of diabetes  Outcome: Progressing  Goal: Electrolytes maintained within normal limits  Description: INTERVENTIONS:  - Monitor labs and rhythm and assess patient for signs and symptoms of electrolyte imbalances  - Administer electrolyte replacement as ordered  - Monitor response to electrolyte replacements, including rhythm and repeat lab results as appropriate  - Fluid restriction as ordered  - Instruct patient on fluid and nutrition restrictions as appropriate  Outcome: Progressing  Goal: Hemodynamic stability and optimal renal function maintained  Description: INTERVENTIONS:  - Monitor labs and assess for signs and symptoms of volume excess or deficit  - Monitor  intake, output and patient weight  - Monitor urine specific gravity, serum osmolarity and serum sodium as indicated or ordered  - Monitor response to interventions for patient's volume status, including labs, urine output, blood pressure (other measures as available)  - Encourage oral intake as appropriate  - Instruct patient on fluid and nutrition restrictions as appropriate  Outcome: Progressing     Problem: NEUROLOGICAL - ADULT  Goal: Achieves stable or improved neurological status  Description: INTERVENTIONS  - Assess for and report changes in neurological status  - Initiate measures to prevent increased intracranial pressure  - Maintain blood pressure and fluid volume within ordered parameters to optimize cerebral perfusion and minimize risk of hemorrhage  - Monitor temperature, glucose, and sodium. Initiate appropriate interventions as ordered  Outcome: Progressing  Goal: Absence of seizures  Description: INTERVENTIONS  - Monitor for seizure activity  - Administer anti-seizure medications as ordered  - Monitor neurological status  Outcome: Progressing  Goal: Remains free of injury related to seizure activity  Description: INTERVENTIONS:  - Maintain airway, patient safety  and administer oxygen as ordered  - Monitor patient for seizure activity, document and report duration and description of seizure to MD/LIP  - If seizure occurs, turn patient to side and suction secretions as needed  - Reorient patient post seizure  - Seizure pads on all 4 side rails  - Instruct patient/family to notify RN of any seizure activity  - Instruct patient/family to call for assistance with activity based on assessment  Outcome: Progressing  Goal: Achieves maximal functionality and self care  Description: INTERVENTIONS  - Monitor swallowing and airway patency with patient fatigue and changes in neurological status  - Encourage and assist patient to increase activity and self care with guidance from PT/OT  - Encourage visually  impaired, hearing impaired and aphasic patients to use assistive/communication devices  Outcome: Progressing     Problem: PAIN - ADULT  Goal: Verbalizes/displays adequate comfort level or patient's stated pain goal  Description: INTERVENTIONS:  - Encourage pt to monitor pain and request assistance  - Assess pain using appropriate pain scale  - Administer analgesics based on type and severity of pain and evaluate response  - Implement non-pharmacological measures as appropriate and evaluate response  - Consider cultural and social influences on pain and pain management  - Manage/alleviate anxiety  - Utilize distraction and/or relaxation techniques  - Monitor for opioid side effects  - Notify MD/LIP if interventions unsuccessful or patient reports new pain  - Anticipate increased pain with activity and pre-medicate as appropriate  Outcome: Progressing

## 2024-09-24 ENCOUNTER — HOSPITAL ENCOUNTER (OUTPATIENT)
Dept: CV DIAGNOSTICS | Facility: HOSPITAL | Age: 54
Discharge: HOME | End: 2024-09-24
Attending: Other
Payer: COMMERCIAL

## 2024-09-24 ENCOUNTER — APPOINTMENT (OUTPATIENT)
Dept: CT IMAGING | Facility: HOSPITAL | Age: 54
End: 2024-09-24
Attending: Other
Payer: COMMERCIAL

## 2024-09-24 VITALS
HEIGHT: 67.99 IN | HEART RATE: 68 BPM | OXYGEN SATURATION: 96 % | WEIGHT: 158.75 LBS | DIASTOLIC BLOOD PRESSURE: 78 MMHG | SYSTOLIC BLOOD PRESSURE: 134 MMHG | RESPIRATION RATE: 11 BRPM | TEMPERATURE: 99 F | BODY MASS INDEX: 24.06 KG/M2

## 2024-09-24 DIAGNOSIS — I63.9 ACUTE CVA (CEREBROVASCULAR ACCIDENT) (HCC): ICD-10-CM

## 2024-09-24 PROBLEM — F45.0 ANXIETY WITH SOMATIZATION: Status: ACTIVE | Noted: 2024-09-24

## 2024-09-24 PROBLEM — F31.62 MODERATE MIXED BIPOLAR I DISORDER (HCC): Status: ACTIVE | Noted: 2024-09-24

## 2024-09-24 PROBLEM — F41.9 ANXIETY WITH SOMATIZATION: Status: ACTIVE | Noted: 2024-09-24

## 2024-09-24 LAB
ALBUMIN SERPL-MCNC: 4.7 G/DL (ref 3.2–4.8)
ALBUMIN/GLOB SERPL: 2 {RATIO} (ref 1–2)
ALP LIVER SERPL-CCNC: 59 U/L
ALT SERPL-CCNC: 15 U/L
ANION GAP SERPL CALC-SCNC: 6 MMOL/L (ref 0–18)
AST SERPL-CCNC: 16 U/L (ref ?–34)
BASOPHILS # BLD AUTO: 0.02 X10(3) UL (ref 0–0.2)
BASOPHILS NFR BLD AUTO: 0.3 %
BILIRUB SERPL-MCNC: 0.6 MG/DL (ref 0.3–1.2)
BUN BLD-MCNC: 14 MG/DL (ref 9–23)
BUN/CREAT SERPL: 12.5 (ref 10–20)
CALCIUM BLD-MCNC: 9.9 MG/DL (ref 8.7–10.4)
CHLORIDE SERPL-SCNC: 108 MMOL/L (ref 98–112)
CO2 SERPL-SCNC: 29 MMOL/L (ref 21–32)
CREAT BLD-MCNC: 1.12 MG/DL
DEPRECATED RDW RBC AUTO: 41.1 FL (ref 35.1–46.3)
DSDNA IGG SERPL IA-ACNC: 0.8 IU/ML
EGFRCR SERPLBLD CKD-EPI 2021: 77 ML/MIN/1.73M2 (ref 60–?)
ENA AB SER QL IA: 0.1 UG/L
ENA AB SER QL IA: NEGATIVE
EOSINOPHIL # BLD AUTO: 0.37 X10(3) UL (ref 0–0.7)
EOSINOPHIL NFR BLD AUTO: 5.7 %
ERYTHROCYTE [DISTWIDTH] IN BLOOD BY AUTOMATED COUNT: 12.3 % (ref 11–15)
GLOBULIN PLAS-MCNC: 2.4 G/DL (ref 2–3.5)
GLUCOSE BLD-MCNC: 85 MG/DL (ref 70–99)
GLUCOSE BLDC GLUCOMTR-MCNC: 103 MG/DL (ref 70–99)
GLUCOSE BLDC GLUCOMTR-MCNC: 106 MG/DL (ref 70–99)
GLUCOSE BLDC GLUCOMTR-MCNC: 94 MG/DL (ref 70–99)
HCT VFR BLD AUTO: 46.1 %
HGB BLD-MCNC: 16.1 G/DL
IMM GRANULOCYTES # BLD AUTO: 0.01 X10(3) UL (ref 0–1)
IMM GRANULOCYTES NFR BLD: 0.2 %
LYMPHOCYTES # BLD AUTO: 1.71 X10(3) UL (ref 1–4)
LYMPHOCYTES NFR BLD AUTO: 26.2 %
MAGNESIUM SERPL-MCNC: 1.9 MG/DL (ref 1.6–2.6)
MCH RBC QN AUTO: 32 PG (ref 26–34)
MCHC RBC AUTO-ENTMCNC: 34.9 G/DL (ref 31–37)
MCV RBC AUTO: 91.7 FL
MONOCYTES # BLD AUTO: 0.55 X10(3) UL (ref 0.1–1)
MONOCYTES NFR BLD AUTO: 8.4 %
NEUTROPHILS # BLD AUTO: 3.87 X10 (3) UL (ref 1.5–7.7)
NEUTROPHILS # BLD AUTO: 3.87 X10(3) UL (ref 1.5–7.7)
NEUTROPHILS NFR BLD AUTO: 59.2 %
OSMOLALITY SERPL CALC.SUM OF ELEC: 296 MOSM/KG (ref 275–295)
PLATELET # BLD AUTO: 225 10(3)UL (ref 150–450)
POTASSIUM SERPL-SCNC: 4 MMOL/L (ref 3.5–5.1)
POTASSIUM SERPL-SCNC: 4 MMOL/L (ref 3.5–5.1)
PROT SERPL-MCNC: 7.1 G/DL (ref 5.7–8.2)
RBC # BLD AUTO: 5.03 X10(6)UL
SODIUM SERPL-SCNC: 143 MMOL/L (ref 136–145)
WBC # BLD AUTO: 6.5 X10(3) UL (ref 4–11)

## 2024-09-24 PROCEDURE — 93247 EXT ECG>7D<15D SCAN A/R: CPT | Performed by: OTHER

## 2024-09-24 PROCEDURE — 93246 EXT ECG>7D<15D RECORDING: CPT | Performed by: OTHER

## 2024-09-24 PROCEDURE — 90792 PSYCH DIAG EVAL W/MED SRVCS: CPT | Performed by: OTHER

## 2024-09-24 PROCEDURE — 70450 CT HEAD/BRAIN W/O DYE: CPT | Performed by: OTHER

## 2024-09-24 RX ORDER — ASPIRIN 81 MG/1
81 TABLET ORAL DAILY
Qty: 30 TABLET | Refills: 0 | Status: SHIPPED | OUTPATIENT
Start: 2024-09-25

## 2024-09-24 RX ORDER — ASPIRIN 81 MG/1
81 TABLET ORAL DAILY
Status: DISCONTINUED | OUTPATIENT
Start: 2024-09-24 | End: 2024-09-24

## 2024-09-24 RX ORDER — GUANFACINE 2 MG/1
2 TABLET, EXTENDED RELEASE ORAL DAILY
COMMUNITY

## 2024-09-24 RX ORDER — GUANFACINE 1 MG/1
2 TABLET, EXTENDED RELEASE ORAL DAILY
Status: DISCONTINUED | OUTPATIENT
Start: 2024-09-24 | End: 2024-09-24

## 2024-09-24 RX ORDER — GUANFACINE 4 MG/1
4 TABLET, EXTENDED RELEASE ORAL DAILY
COMMUNITY

## 2024-09-24 RX ORDER — ATORVASTATIN CALCIUM 40 MG/1
40 TABLET, FILM COATED ORAL NIGHTLY
Qty: 30 TABLET | Refills: 0 | Status: SHIPPED | OUTPATIENT
Start: 2024-09-24

## 2024-09-24 NOTE — CM/SW NOTE
09/24/24 1400   Discharge disposition   Expected discharge disposition Home or Self   Discharge transportation Private car     .servando

## 2024-09-24 NOTE — CONSULTS
[facility]  part of [facility]    Report of Consultation    Huber CHRISS Alicea Patient Status:  Inpatient    1970 MRN [id number]   Location [facility] 2W/SW Attending Rhett Carroll MD   Hosp Day # 1 PCP Robinson Lopez MD     Date of Admission:  2024  Date of Consult:  2024   Reason for Consultation:   Patient presented with increased anxiety, Rhett Chen MD requested psychiatric consult for evaluation and advice.    Consult Duration     The patient seen for initial psychiatric consult evaluation.   Record reviewed, communication with attending, communication with RN and patient seen face to face evaluation.    History of Present Illness:   Patient is a 54 year old   male with history of bipolar disorder who was admitted to the hospital for Acute CVA (cerebrovascular accident) (HCC): Patient indicated for psych consult for evaluation and advise.    Per chart review, the patient presented to the hospital on 2024 by EMS for left sided facial droop with slurred speech and upper and lower extremity weakness and numbness that started while at rest. Patient improved after administration of TNK however, experienced continued left sided numbness and paraesthesias with associated frontal headache. CT head demonstrated no intracranial pathologies confirmed via MRI. CTA demonstrated stenosis of the left LADARIUS, incongruent with clinical symptoms. He was admitted to the hospital and has been demonstrating symptoms of increased anxiety.    The patient received the following psychotropic medications lamotrigine 400mg every day,    Labs and imaging reviewed:   CT head demonstrated no acute intracranial processes. Demonstrated stenosis in the left anterior cerebral artery.  Mg 1.9, Glucose 85, K 4.    Patient not previously seen by service. Follows with Dr. Huber Leger.    The patient seen today sitting in hospital bed. He presented somewhat anxious.     The  patient is alert and oriented to person, place, date and condition. The patient answers questions and engages in appropriate conversation with no impairment in cognition or distortion in thought process.  He demonstrates some tangential thought process and pressured speech.  No neurological deficit has been observed to be reported    Patient expressed significant stress regarding work. The patient works as a  and has recently undergone changes at work. Patient felt that he was going to be fired last week after receiving a 'brutal' review. The patient has been anxious and feeling increased stress since the review. The patient reports taking two days off of work due to the stress.    The patient has a history of bipolar disorder. He states that the manic episodes are triggered by increased stress. Jenni manifests as increased need to work, decreased sleep, and racing thoughts, restlessness and difficulty to cope. The patient admitted that he has been finding himself unable to sleep for the most 36-hour prior to admission and feeling that he is sick and he is going to have stroke and possibly dying like his dad.    Patient reported has never been hospitalized for jenni, however has considered committing himself recently for severe impairment and intense emotional distress.    The patient reporting increased anxiety, worry, ruminations with impairment in sleep.     The patient denying history of psychosis.  The patient denies auditory or visual hallucinations  The patient denies suicidal or homicidal ideation.    The patient has been demonstrating feelings of anxiety, decreased need for sleep, and increased worry.  The patient denies any suicidal ideations. The patient is agreeable to medications changes and to follow up with outpatient psychiatry providers.     Past Psychiatric/Medication History:  1. Prior diagnoses: Bipolar unspecified  2. Past psychiatric inpatient: none  3. Past outpatient  history: Dr. Huber Leger  4. Past suicide history: none  5. Medication history: guanfacine 4mg , lamotrigine 200 mg, Adderall 10mg    Social History:     Patient lives at home with  wife. They have been  for 25 years. They have two children. He works as a .    He reports Occasional use of alcohol and cannabis.       Family History:  Father- stroke at age 60  Medical History:   Past Medical History  Past Medical History:    Asthma (HCC)    childhood    Bipolar 2 disorder (HCC)    Cubital tunnel syndrome    Heart murmur    History of Raynaud's syndrome    Low vitamin D level    profoundly low per patient       Past Surgical History  Past Surgical History:   Procedure Laterality Date    Other      cubital tunnel release    Other      L index finger gangion removal       Family History  Family History   Problem Relation Age of Onset    Stroke Father 67       Social History  Social History     Socioeconomic History    Marital status:    Tobacco Use    Smoking status: Never    Smokeless tobacco: Never   Substance and Sexual Activity    Alcohol use: Yes     Comment: very rare    Drug use: Yes     Types: Cannabis     Comment: very rare   Social History Narrative     with 2 children 7 and 10.         Social Determinants of Health     Food Insecurity: No Food Insecurity (2024)    Food Insecurity     Food Insecurity: Never true   Transportation Needs: No Transportation Needs (2024)    Transportation Needs     Lack of Transportation: No   Housing Stability: Low Risk  (2024)    Housing Stability     Housing Instability: No           Current Medications:  Current Facility-Administered Medications   Medication Dose Route Frequency    aspirin DR tab 81 mg  81 mg Oral Daily    guanFACINE ER (Intuniv) ER tab 2 mg  2 mg Oral Daily    labetalol (Trandate) 5 mg/mL injection 10 mg  10 mg Intravenous Q10 Min PRN    lamoTRIgine (LaMICtal) tab 400 mg  400 mg  Oral Daily    acetaminophen (Tylenol) tab 650 mg  650 mg Oral Q4H PRN    ondansetron (Zofran) 4 MG/2ML injection 4 mg  4 mg Intravenous Q6H PRN    atorvastatin (Lipitor) tab 80 mg  80 mg Oral Nightly     Medications Prior to Admission   Medication Sig    guanFACINE HCl ER 4 MG Oral Tablet 24 Hr Take 1 tablet (4 mg total) by mouth daily.    guanFACINE ER 2 MG Oral Tablet 24 Hr Take 1 tablet (2 mg total) by mouth daily.    lamoTRIgine 200 MG Oral Tab Take 2 tablets (400 mg total) by mouth daily.    amphetamine-dextroamphetamine 20 MG Oral Tab Take 1 tablet by mouth daily.    Lidocaine Viscous HCl 2 % Mouth/Throat Solution Take 5 mL by mouth every 6 (six) hours as needed for Pain.       Allergies  Allergies   Allergen Reactions    Mold OTHER (SEE COMMENTS)    Penicillins OTHER (SEE COMMENTS)     Told by mother       Review of Systems:   As by Admitting/Attending    Results:   Laboratory Data:  Lab Results   Component Value Date    WBC 6.5 09/24/2024    HGB 16.1 09/24/2024    HCT 46.1 09/24/2024    .0 09/24/2024    CREATSERUM 1.12 09/24/2024    BUN 14 09/24/2024     09/24/2024    K 4.0 09/24/2024    K 4.0 09/24/2024     09/24/2024    CO2 29.0 09/24/2024    GLU 85 09/24/2024    CA 9.9 09/24/2024    ALB 4.7 09/24/2024    ALKPHO 59 09/24/2024    TP 7.1 09/24/2024    AST 16 09/24/2024    ALT 15 09/24/2024    PTT 29.0 09/22/2024    INR 0.96 09/22/2024    PTP 13.3 09/22/2024    T4F 1.44 04/24/2018    TSH 0.881 12/21/2021    PSA 0.606 05/27/2021    DDIMER <0.19 12/21/2021    ESRML 1 04/24/2018    CRP 0.10 04/24/2018    MG 1.9 09/24/2024    TROP <0.300 12/21/2021    B12 673 04/24/2018         Imaging:  CT BRAIN OR HEAD (CPT=70450)    Result Date: 9/24/2024  CONCLUSION:   No acute intracranial abnormality.  Preliminary report was submitted by the [facility] teleradiologist and there are no significant discrepancies.    Dictated by (CST): Tal Molina MD on 9/24/2024 at 8:32 AM     Finalized by (CST): Tal Molina,  MD on 9/24/2024 at 8:35 AM          MRI BRAIN (CPT=70551)    Result Date: 9/23/2024  CONCLUSION:  1. No acute intracranial abnormality. Specifically, no evidence of acute or early subacute infarction. 2. Lesser incidental findings as above.   elm-remote  Dictated by (CST): Esvin Capellan MD on 9/23/2024 at 5:27 PM     Finalized by (CST): Esvin Capellan MD on 9/23/2024 at 5:30 PM          CT STROKE CTA BRAIN/CTA NECK (W IV)(CPT=70496/16291)    Result Date: 9/23/2024  CONCLUSION:  1. CTA head:  Focal high-grade stenosis versus tortuous segment the left anterior cerebral artery left A2 segment.  Otherwise, no high-grade stenosis, occlusion, or gross aneurysm in the anterior or posterior circulation. 2. CTA neck: No stenosis, occlusion, or dissection of the carotids or vertebrals.   [facility] provided a preliminary report for this examination. This final report agrees with their preliminary findings.   Dictated by (CST): Ermias Caputo MD on 9/23/2024 at 9:12 AM     Finalized by (CST): Ermias Caputo MD on 9/23/2024 at 9:15 AM          CT STROKE BRAIN (NO IV)(CPT=70450)    Result Date: 9/23/2024  CONCLUSION:  1.  No acute intracranial process. 2.  There are mild microvascular white matter ischemic changes, likely related to long-standing hypertension and/or diabetes. 3.  Atherosclerosis in the anterior and posterior circulations.  [facility] Radiologist Dr Rico discussed the above findings with Dr. Begum on 9/23/24 12:51 a.m. Eastern time.   Dictated by (CST): Ermias Caputo MD on 9/23/2024 at 9:02 AM     Finalized by (CST): Ermias Caputo MD on 9/23/2024 at 9:09 AM          CT BRAIN OR HEAD (CPT=70450)    Result Date: 9/23/2024  CONCLUSION: No acute intracranial process.   [facility] provided a preliminary report for this examination. This final report agrees with their preliminary findings.   Dictated by (CST): Ermias Caputo MD on 9/23/2024 at 6:46 AM     Finalized by (CST): Ermias Caputo MD on 9/23/2024 at 6:47  AM           Vital Signs:   Blood pressure 138/81, pulse 73, temperature 98.7 °F (37.1 °C), temperature source Temporal, resp. rate 12, height 67.99\", weight 72 kg (158 lb 11.7 oz), SpO2 96%.    Mental Status Exam:   Appearance: Stated age male, in hospital gown, laying down in hospital bed.  Psychomotor: restless and anxious   Orientation: Alert and oriented to person, place, time and condition.  Gait: Not evaluated.  Attitude/Coorperation: Cooperative and attentive.  Patient was able to get up and express emotion.  Behavior: Appropriate.  Speech: Regular rate and rhythm speech. Pressured at times  Mood: Patient reporting anxious and stress.  Affect: Anxious and dysphoric affect, congruent with the mood.  Occasional tearfulness episode during evaluation.  Thought process: mostly linear otherwise tangential  Thought content: Patient denies any suicidal or homicidal ideation.  Perceptions: Patient denies any auditory or visual hallucinations.  Concentration: Grossly intact.  Memory: Grossly intact.  Intellect: Average.  Judgment and Insight: appropriate.     Impression:     Anxiety and somatization.  Bipolar disorder, unspecified.  Acute CVA (cerebrovascular accident) (HCC)      The patient is a 54-year-old   male with history of bipolar disorder and ADHD who has been exposed to serious stresses at work.  Patient has been demonstrating increased anxiety, restlessness, difficulty sleeping, excessive worry and a strong belief that the stress going to kill him.  Patient lost his dad for a stroke and age 60.    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    Patient has been demonstrating significant impairment in his function due to his current psychological stress indicating the need for PHP program and short-term disability from work.    Discussed risk and benefit, acknowledging the current symptom and severity.  At this point, I would  recommend the following approach:     Focus on safety  Focus on education and support.  Focus on insight orientation helping the patient understand diagnosis and treatment plan.  Continue Lamictal 400 mg daily.  Continue guanfacine 6 mg daily.  Recommending discontinuation of Adderall.  Psych liaison to arrange PHP program.  Processed with patient at length, the initiation of the above psychotropic medications I advised the patient of the risks, benefits, alternatives and potential side effects. The patient consents to administration of the medications and understands the right to refuse medications at any time. The patient verbalized understanding.   Coordinate plan with team    Orders This Visit:  Orders Placed This Encounter   Procedures    Comp Metabolic Panel (14)    CBC With Differential With Platelet    Prothrombin Time (PT)    PTT, Activated    Troponin I (High Sensitivity)    Magnesium    Comp Metabolic Panel (14)    CBC With Differential With Platelet    Magnesium    Potassium    Hemoglobin A1C    Lipid Panel    Connective Tissue Disease (MEIR) Screen, Reflex Specific Antibody    Type and screen    ABORH (Blood Type)    Antibody Screen    ABORH Confirmation    Rapid SARS-CoV-2 by PCR       Meds This Visit:  Requested Prescriptions     Signed Prescriptions Disp Refills    aspirin 81 MG Oral Tab EC 30 tablet 0     Sig: Take 1 tablet (81 mg total) by mouth daily.    atorvastatin 40 MG Oral Tab 30 tablet 0     Sig: Take 1 tablet (40 mg total) by mouth nightly.       Chucky Kessler MD  9/24/2024    Note to Patient: The 21st Century Cures Act makes medical notes like these available to patients in the interest of transparency. However, be advised this is a medical document. It is intended as peer to peer communication. It is written in medical language and may contain abbreviations or verbiage that are unfamiliar. It may appear blunt or direct. Medical documents are intended to carry relevant information, facts  as evident, and the clinical opinion of the practitioner. This note may have been transcribed using a voice dictation system. Voice recognition errors may occur. This should not be taken to alter the content or meaning of this note.

## 2024-09-24 NOTE — DISCHARGE INSTRUCTIONS
Please continue daily aspirin and statin per neurology, and follow up with neurology in 1 month.    Please follow up with your PCP in 1 week, they will further manage your cholesterol.    Please follow up with Cardiology in 1 week to have Heart monitor arranged, and to further evaluated your Intracardiac Shunt.    Please follow up with Psychiatry in 1 week.        You were seen by Psychiatry while in the hospital. The recommendation is Partial Hospitalization Program. Please call Nate Hensley [phone] to schedule a start date for Adult PHP. Information for the program can be found below.       [facility]    Adult Mental Health - IOP/PHP    [facility]   [address] [place] [address], [address], [place] [address]       IOP - Intensive Outpatient Program PHP - Partial Hospitalization Program   Hours Monday - Friday 8:15 am - 11:15 am  Arrive 15 minutes early on first day of program Monday - Friday 8:15 am - 2:45 pm  Arrive 15 minutes early on first day of program   What to bring Program folder (provided on first day) Program folder (provided on first day)  Lunch     Schedule overview Typical daily activities:  Check-in group   Process group   Skills groups  Wrap-up group Typical daily activities:  Check-in group   Process group   Lunch break  Expressive therapy group  Skills groups  Wrap-up group    Extra treatment sessions (scheduled as needed):  Individual sessions: Patient meets with clinical therapist. IOP- at least once a week, PHP- at least 2 times a week.  Support meetings: Occurs as needed with the patient/family and the clinical therapist.  Medical provider sessions: IOP- at least once a week, PHP- at least 2 times a week.   Important contact   information Treatment contact:   Clinical therapist (CT)- See contact card for phone number   Medical provider- See contact card for phone number  : Donna Mayorga PsyD, [phone]  Insurance  information/questions: [employer], [phone]  Absent or late phone line: [phone]  Poor weather voicemail: [phone]   Special instructions _______________________________  _______________________________  _______________________________     PROGRAM DETAILS  Program length: 4-5 weeks (depends on patient need).     Admission criteria: Significant mental health issues which interfere with academic/work, social and/or daily functioning.    Poor weather: If there is a change in program time due to inclement weather, our voicemail line at   [phone] will be updated after 6:00 am with further information and instructions.     Discharge preparation: Prior to discharge from our program, patients are required to schedule an appointment with an outpatient therapist. Patients on a psychotropic medication are also required to make an appointment with an outpatient medical provider (APN, PA or psychiatrist).     Types of Therapy: The program is in a group format, and focuses on the Dialectical Behavioral Therapy (DBT), Cognitive Behavioral Therapy (CBT), and Acceptance & Commitment Therapy (ACT) frameworks.      ADDITIONAL PROGRAM DETAILS   Treatment team:  Clinical therapist (CT), medical provider (advanced practice nurse (APN), physician’s assistant (PA) and/or psychiatrist), behavioral health associate (BHA), registered nurse (RN), unit specialist (US), and clinical supervisor.  The clinical therapist and medical provider are the patient’s primary point of contact and patients will receive a contact card with their phone numbers.    First day(s)/getting used to the program:   Patients may feel uncomfortable or overwhelmed and should give themselves time to get used to the group.  Within the first three days of program (often the first day), the patient is removed from group to complete the following assessments:    Nursing assessment: Nurse discusses medical history and/or any current medical  concerns.  Psychiatric evaluation: Medical Provider discusses patient’s medical and treatment history to determine diagnoses and medical needs.   Psychosocial: Clinical therapist discusses patient’s history and a safety plan.     Family & Medical Leave Act (FMLA) paperwork:  FMLA paperwork should be obtained from patient’s employer.  The patient will give paperwork directly to the medical provider the next time they meet (these meetings occur 1 to 2 times weekly). The medical provider will then complete the paperwork.   This process can take several days.    Medication/medical concerns:  If the patient takes medication during program hours, only bring in daily dose in the original bottle.  All medications brought in by patient are their responsibility and are to be taken as prescribed.  For prescription refills or to discuss a medical concern/question, contact the nurse.    Virtual Intensive Outpatient Program (IOP): Below are instructions ONLY for Virtual IOP patients not attending group live/in-person.   Equipment: Electronic device with speaker, microphone, camera, and internet service.  First Day: Staff will call to start/orient the patient to program and the virtual platform. First week assessments may also be completed at this time.    CAMPUS POLICIES  Patients are NOT allowed to:  Leave building during program hours unless discussed and approved with your treatment team ([facility] patients can leave during the scheduled lunch break).  Contact (call, text, social media interaction, or meet) other patients outside of program.  Travel to or from program together.  Smoke or vape (smoke-free campus).  Use their cell phone/electronics in program. Please turn off or on silent. Patients must exit the room if they need to make a call.    Late or absent:  No show/no call fee: $75.  A formal risk screening will be completed via phone for all unscheduled absences.  If patient is going to be late or absent, call [phone].  If the patient has not arrived after start time, we contact the patient and their emergency contact(s). If we do NOT make contact with the patient, we may ask the police to conduct a wellness check.    Dress code:  Casual clothes that are comfortable for the season.   The following clothing items are NOT allowed:  Clothing that refers to sex, drugs or illegal substances  Sleeveless/tank tops  Shorts above mid-thigh  Clothing that exposes the patient’s stomach, shoulders or undergarments    Lunch or snack break:  PHP patients have one break scheduled for lunch.  IOP patients do not have a lunch break.  Please do not bring glass bottles or food with nuts.      If you are experiencing a mental health crisis, please call 911 or go to your nearest ED. If you are not in immediate danger but require emotional support and/or assistance, please contact: [facility] at [phone] or [facility] at [phone].

## 2024-09-24 NOTE — PROGRESS NOTES
[facility] Pulmonary, Critical Care and Sleep    Huber CHRISS Alicea Patient Status:  Inpatient    1970 MRN [id number]   Location [facility] 2W/SW Attending Rhett Carroll MD   Hosp Day # 1 PCP Robinson Lopez MD     Date of Admission: 2024 11:28 PM    Admission Diagnosis: Acute CVA (cerebrovascular accident) (HCC) [I63.9]    S: Feeling better.     Scheduled Medications:     aspirin  81 mg Oral Daily    guanFACINE ER  2 mg Oral Daily    lamoTRIgine  400 mg Oral Daily    atorvastatin  80 mg Oral Nightly       Infusing Medications:      PRN Medications:    labetalol    acetaminophen **OR** [DISCONTINUED] acetaminophen    ondansetron    OBJECTIVE:  /68   Pulse 59   Temp 98.1 °F (36.7 °C) (Temporal)   Resp 12   Ht 172.7 cm (5' 7.99\")   Wt 158 lb 11.7 oz (72 kg)   SpO2 96%   BMI 24.14 kg/m²    Temp (24hrs), Av.1 °F (36.7 °C), Min:97.2 °F (36.2 °C), Max:98.5 °F (36.9 °C)             Wt Readings from Last 3 Encounters:   24 158 lb 11.7 oz (72 kg)   24 155 lb (70.3 kg)   21 158 lb (71.7 kg)       I/O last 3 completed shifts:  In: 1290 [P.O.:200; I.V.:1090]  Out: 2400 [Urine:2400]  No intake/output data recorded.     O2: RA  General: NAD.   Neuro: Alert, no focal deficits.    HEENT: PERRL  Neck : No LAD  CV: RRR, nl S1, S2, no S4, S3 or murmur.   Lungs: Clear bilaterally.   Abd: Nontender, non distended.   Ext: No edema.   Skin: No rashes.     Recent Labs   Lab 24  0624   WBC 5.7 6.5   HGB 15.4 16.1   HCT 42.7 46.1   .0 225.0     Recent Labs   Lab 24  0624   GLU 98 85   BUN 15 14   CREATSERUM 1.58* 1.12   CA 9.6 9.9    143   K 3.7 4.0  4.0    108   CO2 28.0 29.0   AST 16 16   ALT 17 15   ALB 4.6 4.7     Recent Labs   Lab 24  2350   INR 0.96   PTT 29.0     No results for input(s): \"ABGPHT\", \"YGVYOX7Y\", \"GXSYL0M\", \"ABGHCO3\", \"SITE\", \"DEV\", \"THGB\" in the last 168 hours.    COVID-19 Lab Results    COVID-19  Lab  Results   Component Value Date    COVID19 Not Detected 09/23/2024    COVID19 NOT DETECTED 12/21/2021    COVID19 Not Detected 04/29/2020       Pro-Calcitonin  No results for input(s): \"PCT\" in the last 168 hours.    Cardiac  No results for input(s): \"TROP\", \"PBNP\" in the last 168 hours.    Creatinine Kinase  No results for input(s): \"CK\" in the last 168 hours.    Inflammatory Markers  No results for input(s): \"CRP\", \"GILBERTO\", \"LDH\", \"DDIMER\" in the last 168 hours.    Imaging:     Echo 9/23:  1. Left ventricle: The cavity size was normal. Wall thickness was normal.      Systolic function was normal. The estimated ejection fraction was 55-60%,      by visual assessment. No diagnostic evidence for regional wall motion      abnormalities. Left ventricular diastolic function parameters were      normal.   2. Left atrium: The left atrial volume was normal.   3. Atrial septum: Agitated saline contrast study showed a right-to-left      intrapulmonary shunt, in the baseline state.   4. Pulmonary arteries: Systolic pressure was within the normal range,      estimated to be 21mm Hg.   Impressions:  No previous study was available for comparison.     CT head 9/24:  CSF SPACES: Ventricles, cisterns, and sulci are appropriate for age.  No hydrocephalus, subarachnoid hemorrhage, or mass.  No midline shift.    CEREBRUM: No edema, hemorrhage, mass, acute infarction, or significant atrophy.    WHITE MATTER: Normal white matter.    CEREBELLUM: No edema, hemorrhage, mass, acute infarction, or significant atrophy.    BRAINSTEM: No edema, hemorrhage, mass, acute infarction, or significant atrophy.    CALVARIUM: No apparent fracture, mass, or other significant visible lesion.    SINUSES: Limited views demonstrate no significant mucosal thickening or fluid.    ORBITS: Limited views are unremarkable.       OTHER: Negative.                 Impression   CONCLUSION:     No acute intracranial abnormality.     Assessment/Plan   CVA L sided weakness  s/p TNK 9/23/2024  Unclear etiology but has sedentary job and telangietasis.   ?AVM  Echo with bubble study with normal EF and R to L shunt. Outpt f/u with cardiology.   Neuro following.   Check MEIR and Anti SCL 70, ?CREST syndrome with esophageal issues. Pending results.   Tele. D/c with heart monitor per neuro.   Neuro checks  Psych eval  Snoring  Suspect RICKIE  Outpt PSG especially with CVA.   Nutrtion   Advance diet.   Proph  SCD. Lovenox when Ok with neuro.   Bipolar   Continue meds.   Dispo  Full code. OK for tele or d/c from pulm standpoint once cleared by other services.     My best regards,         Nelson Pepe MD  [facility] Pulmonary, Critical Care and Sleep Medicine

## 2024-09-24 NOTE — DISCHARGE SUMMARY
General Medicine Discharge Summary     Patient ID:  Huber Alicea  54 year old  8/14/1970    Admit date: 9/22/2024    Discharge date and time: 9/24/2024     Attending Physician: Rhett Carroll MD     Consults: IP CONSULT TO NEUROLOGY  IP CONSULT TO PULMONOLOGY  IP CONSULT TO SOCIAL WORK  IP CONSULT TO PSYCHIATRY    Primary Care Physician: Robinson Lopez MD     Reason for admission: left sided weakness     Risk For Readmission: low    Discharge Diagnoses: Acute CVA (cerebrovascular accident) (HCC) [I63.9]  See Additional Discharge Diagnoses in Hospital Course    Discharged Condition: good    Follow-up with labs/images appointments:    Discharge Instructions         Please continue daily aspirin and statin per neurology, and follow up with neurology in 1 month.    Please follow up with your PCP in 1 week, they will further manage your cholesterol.    Please follow up with Cardiology in 1 week to have Heart monitor arranged, and to further evaluated your Intracardiac Shunt.    Please follow up with Psychiatry in 1 week.            Exam  Gen: No acute distress  Pulm: Lungs clear, normal respiratory effort  CV: Heart with regular rate and rhythm  Abd: Abdomen soft,   EXT: no edema     HPI:   History of Present Illness: Mr. Alicea is a 54 year old male with PMH sig for Bipolar DO, ADHD, who presents with left sided facial droop, and left arm and leg weakness.  Patient states around 11:15 last night he developed left sided facial droop and numbness, slurred speech, left arm and leg numbness/weakness.  He immediately came to the hospital, he states prior to this he felt tired, during the weekend, no HA or vision changes, did have a lot of stress this weak, no other complaints.  Denies CP or sob, no fevers, or chills, no HA or vision changes, no nausea or vomiting, states his speech and left sided symptoms are much improved, he continues to have some numbness of his face but other wise improved.         Hospital Course:    Mr. Alicea is a 54 year old male with PMH sig for Bipolar DO, ADHD, who presents with left sided facial droop, and left arm and leg weakness, Neurology consulted, TNK given in ER, symptoms improved, speech and facial droop resolved, left sided weakness improved, some persistent numbness of face noted, MRI brain negative for acute infarct, CTA without LVO, but stenosis of Left LADARIUS (not correlating with symptoms), neurology did not think symptoms were all consistent with ischemic stoke, as sensory features were inconsistent, recommended treatment of poss TIA with aspirin, and statin, outpatient fu with cardiology for heart monitor and eval of poss PFO, as well as psychiatry eval.  Psychiatry evaluated,  they noted patient with extreme stress and do believe symptoms could be related to conversion disorder.  Ok with DC home but arranging for outpatient PHP program, patient in agreement.  Neurology/pulm/psyc ok for DC home, plan for outpatient PHP program to be arranged, also fu with PCP in 1 week, and neurology in 2 weeks.  Vitals stable, cleared by PT/OT/Speech as well for DC home.       TIA  Left sided weakness/left sided facial droop  HLD  Conversion Disorder?  - s/p TNK given in ER  - symptoms improved  - Neurology, pulm/ psyc consulted  - follow TNK protocol  - no focal weakness now  - tele neg, MRI without stroke, ECHO with normal LV function, bubble study with R to L shunt, neuro did not think BENJI was necessary, did recommend outpatient cardiology follow up for this and heart monitor  - check A1c 5.3, Lipids with  and TC > 200, statin started, fu with PCP  - symptoms improved  - neurology recommended aspirin and statin on DC, and fu in 2-4 weeks.    - neurology recommended psyc eval, psyc thinks poss conversion disorder from extreme stress, outpatient PHP program recommended     Bipolar DO  ADHD  - resume lamictal  - stop aldderal  - seen by psychiatry  - plan for outpatient PHP program     Operative  Procedures:      Imaging: CT BRAIN OR HEAD (CPT=70450)    Result Date: 9/24/2024  CONCLUSION:   No acute intracranial abnormality.  Preliminary report was submitted by the [facility] teleradiologist and there are no significant discrepancies.    Dictated by (CST): Tal Molina MD on 9/24/2024 at 8:32 AM     Finalized by (CST): Tal Molina MD on 9/24/2024 at 8:35 AM          MRI BRAIN (CPT=70551)    Result Date: 9/23/2024  CONCLUSION:  1. No acute intracranial abnormality. Specifically, no evidence of acute or early subacute infarction. 2. Lesser incidental findings as above.   elm-remote  Dictated by (CST): Esvin Capellan MD on 9/23/2024 at 5:27 PM     Finalized by (CST): Esvin Capellan MD on 9/23/2024 at 5:30 PM          CT STROKE CTA BRAIN/CTA NECK (W IV)(CPT=70496/15190)    Result Date: 9/23/2024  CONCLUSION:  1. CTA head:  Focal high-grade stenosis versus tortuous segment the left anterior cerebral artery left A2 segment.  Otherwise, no high-grade stenosis, occlusion, or gross aneurysm in the anterior or posterior circulation. 2. CTA neck: No stenosis, occlusion, or dissection of the carotids or vertebrals.   [email or web address] provided a preliminary report for this examination. This final report agrees with their preliminary findings.   Dictated by (CST): Ermias Caputo MD on 9/23/2024 at 9:12 AM     Finalized by (CST): Ermias Caputo MD on 9/23/2024 at 9:15 AM          CT STROKE BRAIN (NO IV)(CPT=70450)    Result Date: 9/23/2024  CONCLUSION:  1.  No acute intracranial process. 2.  There are mild microvascular white matter ischemic changes, likely related to long-standing hypertension and/or diabetes. 3.  Atherosclerosis in the anterior and posterior circulations.  [facility] Radiologist Dr Rico discussed the above findings with Dr. Begum on 9/23/24 12:51 a.m. Eastern time.   Dictated by (CST): Ermias Caputo MD on 9/23/2024 at 9:02 AM     Finalized by (CST): Ermias Caputo MD on 9/23/2024 at 9:09 AM          CT BRAIN OR  HEAD (CPT=70450)    Result Date: 9/23/2024  CONCLUSION: No acute intracranial process.   [facility] provided a preliminary report for this examination. This final report agrees with their preliminary findings.   Dictated by (CST): Ermias Caputo MD on 9/23/2024 at 6:46 AM     Finalized by (CST): Ermias Caputo MD on 9/23/2024 at 6:47 AM             Disposition: home    Activity: activity as tolerated  Diet: regular diet  Wound Care: as directed  Code Status: Full Code      Home Medication Changes:     Med list     Medication List        START taking these medications      aspirin 81 MG Tbec  Take 1 tablet (81 mg total) by mouth daily.  Start taking on: September 25, 2024     atorvastatin 40 MG Tabs  Commonly known as: Lipitor  Take 1 tablet (40 mg total) by mouth nightly.            CONTINUE taking these medications      amphetamine-dextroamphetamine 20 MG Tabs  Commonly known as: Adderall     * guanFACINE HCl ER 4 MG Tb24  Commonly known as: Intuniv     * guanFACINE ER 2 MG Tb24  Commonly known as: Intuniv     lamoTRIgine 200 MG Tabs  Commonly known as: LaMICtal     Lidocaine Viscous HCl 2 % Soln  Commonly known as: XYLOCAINE  Take 5 mL by mouth every 6 (six) hours as needed for Pain.           * This list has 2 medication(s) that are the same as other medications prescribed for you. Read the directions carefully, and ask your doctor or other care provider to review them with you.                   Where to Get Your Medications        These medications were sent to [id number] - [place], [place] - [address] AT [facility], [phone], [phone]  [address], [facility] [address]      Phone: [phone]   aspirin 81 MG Tbec  atorvastatin 40 MG Tabs         FU   Follow-up Information       Robinson Torres MD. Schedule an appointment as soon as possible for a visit in 1 week(s).    Specialty: Internal Medicine  Contact information:  [address]  [address]  [facility] [address]  [phone]               [facility] Follow up in 1 month(s).    Why: Neuro/Stroke follow up  Contact information:  Neurology  [address] [facility] [address]  [place] [address]  [phone]             Won Menendez MD. Schedule an appointment as soon as possible for a visit in 1 week(s).    Specialty: Interventional, Cardiology  Why: cardiology follow up for Event monitor and consideration of PFO eval  Contact information:  [address]  [address]  [facility] [address]  [phone]                             DC instructions:      Other Discharge Instructions:         Please continue daily aspirin and statin per neurology, and follow up with neurology in 1 month.    Please follow up with your PCP in 1 week, they will further manage your cholesterol.    Please follow up with Cardiology in 1 week to have Heart monitor arranged, and to further evaluated your Intracardiac Shunt.    Please follow up with Psychiatry in 1 week.          I reconciled current and discharge medications on day of discharge, discussed changes with patient and noted changes above.       Total Time Coordinating Care: Greater than 30 minutes    Patient had opportunity to ask questions and state understand and agree with therapeutic plan as outlined    Thank You,    Rhett Carroll MD   Hospitalist with [facility]

## 2024-09-24 NOTE — BH LEVEL OF CARE ASSESSMENT
Crisis Evaluation Assessment    Huber CHRISS Alicea YOB: 1970   Age 54 year old MRN [id number]   Location [facility] 2W/SW Attending Rhett Carroll MD      Patient's legal sex: male  Patient identifies as: male  Patient's birth sex: male  Preferred pronouns: he/him    Date of Service: 2024     Reason for Crisis Evaluation     Patient presented to the hospital on  via EMS for left sided facial droop with slurred speech, upper and lower extremity weakness and numbness. Patient admitted to the hospital and has been demonstrating increased symptoms of anxiety.     Patient reports experiencing increased anxiety and stress, uncontrollable worry, nervousness, racing thoughts. Patient reports significant ongoing stressors related to work. Pt works as a  and has recently requested to switch teams. Patient reported fear of being fired last week after calling off work for two days due to fatigue and lack of sleep. He also disclosed a history of insomnia, describing an ongoing cycle where lack of exercise contributes to poor sleep, resulting in fatigue and difficulty attending work.     Patient has a diagnosis of bipolar disorder and reports that his manic episodes are typically triggered by increased stress. Patient denies being hospitalized for psychiatric reasons. He denies any history of self harming behaviors or attempts to harm others.     Outpatient psychiatrist is  Dr. Huber Burrell.     History of bipolar disorder, ADD, and executive function dysfunction d/o (diagnosed 16 years ago).    Collateral    Psych eval per Dr Kessler      Suicide Crisis Syndrome:      Suicide Risk Screenin. Have you wished you were dead or wished you could go to sleep and not wake up? (past 30 days): No   2. Have you actually had any thoughts of killing yourself? (past 30 days): No        6. Have you ever done anything, started to do anything, or prepared to do anything to end your life?  (lifetime): No     Score - BH OV: No Risk    Suicide Risk Assessments:      Family history + for suicide attempts, none completed. Pt denies hx of suicide attempts. He reports a past instance of suicidal thoughts, which occurred after drinking alcohol one evening and he yelled at his children. Pt immediately felt guilty, leading him to briefly think his family would be better off without him, however he has never made any attempts. Patient denies owning a firearm/weapons.     Non-Suicidal Self-Injury:   See above.       Risk to Others  No current or history of HI.       Access to Means:  See above.     Protective Factors: Pt states there isn't one particular thing that's going badly, pt describes himself as a hard worker and doesn't give up.    Review of Psychiatric Systems:  Patient alert and oriented. He presents as slightly anxious. Thoughts process is linear and logical. Pt reports impairment in his sleep, specifically ability to fall asleep. No disruptions throughout the night.  Pt is not demonstrating any psychosis. No evidence of delusions, auditory/visual hallucinations, or paranoia.      Substance Use:    No history of substance misuse or treatment. Patient reports occasional alcohol use, states he will drink 1-2x monthly. Cannabis use 1x week. States helps his anxiety and calms him down. Hx of LSD1x at age 17. MDMA use a ' handful of times'-last use over 26 years ago.     Functional Achievement:     See above.     Ability to Care for Self::    Pt can complete ADLS independently.    Current Treatment and Treatment History: current outpatient psychiatrist is Dr Huber Mortensen. History of ind therapy (CBT), none currently.     School/Work Performance:  See above.       Renaldo and Complex (as applicable):      See above.     Current Medical (as applicable):     See above.     EDP Assessment (as applicable):  IBW Calculations  Weight: 158 lb 11.7 oz  BMI (Calculated): 24.6  IBW LBS [place]: 153.95 LBS  IBW %:  104.97 %  IBW + 10%: 169.35 LBS  IBW - 10%: 138.56 LBS            Abuse Assessment:  Abuse Assessment  Does anyone say or do something to you that makes you feel unsafe?: No    Mental Status Exam:         See above.     Disposition: Adult PHP/IOP at [facility].     Rationale for Treatment Recommendation:         Patient is a 54 year old  male with a history of bipolar disorder and ADHD, who is experiencing increased anxiety and ongoing stressors at work. Patient has been experiencing impairment in sleep and uncontrollable worry and difficulty coping with ongoing stressors.     Psychiatry is recommending PHP and short term disability from work due to increased stress and anxiety due to his current psychological stress.     Patient states he will discuss program with wife and decide when he could start. Pt given info for [facility] PHP/IOP in discharge instructions  and encouraged to call to schedule start date. Pt verbalized understanding and had no further questions/concerns.     Diagnoses with F-Codes:  Primary Psychiatric Diagnosis  Anxiety and somatization.    Secondary Psychiatric Diagnoses  Bipolar disorder, unspecified.      LEVY Monaco  [id number]

## 2024-09-24 NOTE — SLP NOTE
SPEECH DAILY NOTE - INPATIENT    ASSESSMENT & PLAN   ASSESSMENT  PPE REQUIRED. THIS THERAPIST WORE GLOVES, DROPLET MASK, AND GOGGLES FOR DURATION OF EVALUATION. HANDS WASHED UPON ENTRANCE/EXIT.    SLP f/u for ongoing dysphagia tx/meal assessment per recommendations of regular/thin per BSE. RN reports pt tolerates diet and medication well with no overt clinical s/s aspiration. Pt denies any swallowing challenges.     Pt positioned upright in bed. Pt afebrile, tolerating room air with oxygen status 97 prior to the start of oral trials. SLP reviewed aspiration precautions and safe swallowing compensatory strategies with the patient. Safe swallow guidelines remain written on the white board in purple. Diet recommendations remain on the whiteboard in the room. Patient v/u. Provided no assistance, pt tolerates regular and thin liquids via straw with no overt clinical signs/symptoms of aspiration. Oxygen status remained >95 t/o the entire session.     PLAN: SLP to sign off at this time secondary to pt tolerating least restrictive diet with no CSA. MRI negative and no complaints of cognitive-communication deficits.     Diet Recommendations - Solids: Regular  Diet Recommendations - Liquids: Thin Liquids    Compensatory Strategies Recommended: Slow rate  Aspiration Precautions: Upright position;Slow rate  Medication Administration Recommendations:  (as tolerated)    Patient Experiencing Pain: No                Treatment Plan  Treatment Plan/Recommendations: No further inpatient SLP service warranted    Interdisciplinary Communication: Discussed with RN  Recommendations posted at bedside            GOALS  Goal #1 The patient will tolerate regular consistency and thin liquids without overt signs or symptoms of aspiration with 100 % accuracy over 1 session(s).  Goal met   Goal #2 The patient/family/caregiver will demonstrate understanding and implementation of aspiration precautions and swallow strategies independently over 1  session(s).    Goal met     FOLLOW UP  Follow Up Needed (Documentation Required): No  SLP Follow-up Date:  (n/a)  Number of Visits to Meet Established Goals: 0    Session: 1 following BSSE    If you have any questions, please contact KARL Calles M.S., CCC-SLP  Speech Language Pathologist  Phone Number Ext. [phone]

## 2024-09-24 NOTE — PLAN OF CARE
Reviewed MRI brain, no acute strokes  Echocardiogram with normal EF, left atrium size normal  Continue aspirin+statin  FU neurology clinic in 3 months      Addendum  I was contacted buy primary service regarding echo showing left to right shunt- He can be evaluated by cardiology as outpatient  Ok with cardiac monitor on discharge

## 2024-09-25 NOTE — PAYOR COMM NOTE
--------------  DISCHARGE REVIEW    Payor: [facility]  Subscriber #:  [id number]  Authorization Number: [id number]    Admit date: 9/23/24  Admit time:   3:24 AM  Discharge Date: 9/24/2024  3:29 PM     Admitting Physician: Praful Morrow MD  Attending Physician:  No att. providers found  Primary Care Physician: Robinson Torres MD          Discharge Summary Notes        Discharge Summary signed by Rhett Carroll MD at 9/24/2024  1:38 PM       Author: Rhett Carroll MD Specialty: HOSPITALIST, Internal Medicine Author Type: Physician    Filed: 9/24/2024  1:38 PM Date of Service: 9/24/2024 11:25 AM Status: Addendum    : Rhett Carroll MD (Physician)    Related Notes: Original Note by Rhett Carroll MD (Physician) filed at 9/24/2024  1:37 PM           General Medicine Discharge Summary     Patient ID:  Huber Alicea  54 year old  8/14/1970    Admit date: 9/22/2024    Discharge date and time: 9/24/2024     Attending Physician: Rhett Carroll MD     Consults: IP CONSULT TO NEUROLOGY  IP CONSULT TO PULMONOLOGY  IP CONSULT TO SOCIAL WORK  IP CONSULT TO PSYCHIATRY    Primary Care Physician: Robinson Lopez MD     Reason for admission: left sided weakness     Risk For Readmission: low    Discharge Diagnoses: Acute CVA (cerebrovascular accident) (HCC) [I63.9]  See Additional Discharge Diagnoses in Hospital Course    Discharged Condition: good    Follow-up with labs/images appointments:    Discharge Instructions         Please continue daily aspirin and statin per neurology, and follow up with neurology in 1 month.    Please follow up with your PCP in 1 week, they will further manage your cholesterol.    Please follow up with Cardiology in 1 week to have Heart monitor arranged, and to further evaluated your Intracardiac Shunt.    Please follow up with Psychiatry in 1 week.            Exam  Gen: No acute distress  Pulm: Lungs clear, normal respiratory effort  CV: Heart with regular rate and rhythm  Abd: Abdomen soft,   EXT: no  edema     HPI:   History of Present Illness: Mr. Alicea is a 54 year old male with PMH sig for Bipolar DO, ADHD, who presents with left sided facial droop, and left arm and leg weakness.  Patient states around 11:15 last night he developed left sided facial droop and numbness, slurred speech, left arm and leg numbness/weakness.  He immediately came to the hospital, he states prior to this he felt tired, during the weekend, no HA or vision changes, did have a lot of stress this weak, no other complaints.  Denies CP or sob, no fevers, or chills, no HA or vision changes, no nausea or vomiting, states his speech and left sided symptoms are much improved, he continues to have some numbness of his face but other wise improved.         Hospital Course:   Mr. Alicea is a 54 year old male with PMH sig for Bipolar DO, ADHD, who presents with left sided facial droop, and left arm and leg weakness, Neurology consulted, TNK given in ER, symptoms improved, speech and facial droop resolved, left sided weakness improved, some persistent numbness of face noted, MRI brain negative for acute infarct, CTA without LVO, but stenosis of Left LADARIUS (not correlating with symptoms), neurology did not think symptoms were all consistent with ischemic stoke, as sensory features were inconsistent, recommended treatment of poss TIA with aspirin, and statin, outpatient fu with cardiology for heart monitor and eval of poss PFO, as well as psychiatry eval.  Psychiatry evaluated,  they noted patient with extreme stress and do believe symptoms could be related to conversion disorder.  Ok with DC home but arranging for outpatient PHP program, patient in agreement.  Neurology/pulm/psyc ok for DC home, plan for outpatient PHP program to be arranged, also fu with PCP in 1 week, and neurology in 2 weeks.  Vitals stable, cleared by PT/OT/Speech as well for DC home.       TIA  Left sided weakness/left sided facial droop  HLD  Conversion Disorder?  - s/p  TNK given in ER  - symptoms improved  - Neurology, pulm/ psyc consulted  - follow TNK protocol  - no focal weakness now  - tele neg, MRI without stroke, ECHO with normal LV function, bubble study with R to L shunt, neuro did not think BENJI was necessary, did recommend outpatient cardiology follow up for this and heart monitor  - check A1c 5.3, Lipids with  and TC > 200, statin started, fu with PCP  - symptoms improved  - neurology recommended aspirin and statin on DC, and fu in 2-4 weeks.    - neurology recommended psyc eval, psyc thinks poss conversion disorder from extreme stress, outpatient PHP program recommended     Bipolar DO  ADHD  - resume lamictal  - stop aldderal  - seen by psychiatry  - plan for outpatient PHP program     Operative Procedures:      Imaging: CT BRAIN OR HEAD (CPT=70450)    Result Date: 9/24/2024  CONCLUSION:   No acute intracranial abnormality.  Preliminary report was submitted by the [facility] teleradiologist and there are no significant discrepancies.    Dictated by (CST): Tal Molina MD on 9/24/2024 at 8:32 AM     Finalized by (CST): Tal Molina MD on 9/24/2024 at 8:35 AM          MRI BRAIN (CPT=70551)    Result Date: 9/23/2024  CONCLUSION:  1. No acute intracranial abnormality. Specifically, no evidence of acute or early subacute infarction. 2. Lesser incidental findings as above.   elm-remote  Dictated by (CST): Esvin Capellan MD on 9/23/2024 at 5:27 PM     Finalized by (CST): Esvin Capellan MD on 9/23/2024 at 5:30 PM          CT STROKE CTA BRAIN/CTA NECK (W IV)(CPT=70496/37902)    Result Date: 9/23/2024  CONCLUSION:  1. CTA head:  Focal high-grade stenosis versus tortuous segment the left anterior cerebral artery left A2 segment.  Otherwise, no high-grade stenosis, occlusion, or gross aneurysm in the anterior or posterior circulation. 2. CTA neck: No stenosis, occlusion, or dissection of the carotids or vertebrals.   [facility] provided a preliminary report for this  examination. This final report agrees with their preliminary findings.   Dictated by (CST): Ermias Caputo MD on 9/23/2024 at 9:12 AM     Finalized by (CST): Ermias Caputo MD on 9/23/2024 at 9:15 AM          CT STROKE BRAIN (NO IV)(CPT=70450)    Result Date: 9/23/2024  CONCLUSION:  1.  No acute intracranial process. 2.  There are mild microvascular white matter ischemic changes, likely related to long-standing hypertension and/or diabetes. 3.  Atherosclerosis in the anterior and posterior circulations.  [facility] Dr Rico discussed the above findings with Dr. Begum on 9/23/24 12:51 a.m. Eastern time.   Dictated by (CST): Ermias Caputo MD on 9/23/2024 at 9:02 AM     Finalized by (CST): Ermias Caputo MD on 9/23/2024 at 9:09 AM          CT BRAIN OR HEAD (CPT=70450)    Result Date: 9/23/2024  CONCLUSION: No acute intracranial process.   [facility] provided a preliminary report for this examination. This final report agrees with their preliminary findings.   Dictated by (CST): Ermias Caputo MD on 9/23/2024 at 6:46 AM     Finalized by (CST): Ermias Caputo MD on 9/23/2024 at 6:47 AM             Disposition: home    Activity: activity as tolerated  Diet: regular diet  Wound Care: as directed  Code Status: Full Code      Home Medication Changes:     Med list     Medication List        START taking these medications      aspirin 81 MG Tbec  Take 1 tablet (81 mg total) by mouth daily.  Start taking on: September 25, 2024     atorvastatin 40 MG Tabs  Commonly known as: Lipitor  Take 1 tablet (40 mg total) by mouth nightly.            CONTINUE taking these medications      amphetamine-dextroamphetamine 20 MG Tabs  Commonly known as: Adderall     * guanFACINE HCl ER 4 MG Tb24  Commonly known as: Intuniv     * guanFACINE ER 2 MG Tb24  Commonly known as: Intuniv     lamoTRIgine 200 MG Tabs  Commonly known as: LaMICtal     Lidocaine Viscous HCl 2 % Soln  Commonly known as: XYLOCAINE  Take 5 mL by mouth every 6 (six)  hours as needed for Pain.           * This list has 2 medication(s) that are the same as other medications prescribed for you. Read the directions carefully, and ask your doctor or other care provider to review them with you.                   Where to Get Your Medications        These medications were sent to [id number] - [place], [place] - [address] AT [facility], [phone], [phone]  [address], [facility] [address]      Phone: [phone]   aspirin 81 MG Tbec  atorvastatin 40 MG Tabs         FU   Follow-up Information       Robinson Torres MD. Schedule an appointment as soon as possible for a visit in 1 week(s).    Specialty: Internal Medicine  Contact information:  [address] [facility]  [address]  [place] [address]  [phone]               DWIGHT Harper Follow up in 1 month(s).    Why: Neuro/Stroke follow up  Contact information:  Neurology  [facility] [address] [address]  [place] [address]  [phone]             Won Menendez MD. Schedule an appointment as soon as possible for a visit in 1 week(s).    Specialty: Interventional, Cardiology  Why: cardiology follow up for Event monitor and consideration of PFO eval  Contact information:  [address] [facility]  [address]  [facility] [address]  [phone]                             DC instructions:      Other Discharge Instructions:         Please continue daily aspirin and statin per neurology, and follow up with neurology in 1 month.    Please follow up with your PCP in 1 week, they will further manage your cholesterol.    Please follow up with Cardiology in 1 week to have Heart monitor arranged, and to further evaluated your Intracardiac Shunt.    Please follow up with Psychiatry in 1 week.          I reconciled current and discharge medications on day of discharge, discussed changes with patient and noted changes above.       Total Time Coordinating Care: Greater than 30 minutes    Patient  had opportunity to ask questions and state understand and agree with therapeutic plan as outlined    Thank You,    Rhett Carroll MD   Hospitalist with [facility]         Electronically signed by Rhett Carroll MD on 9/24/2024  1:38 PM         REVIEWER COMMENTS

## 2024-09-25 NOTE — PAYOR COMM NOTE
--------------  ADMISSION REVIEW     Payor: KIP [employer]  Subscriber #:  [id number]  Authorization Number: [id number]    Admit date: 9/23/24  Admit time:  3:24 AM       REVIEW DOCUMENTATION:     ED Provider Notes        Stated Complaint: L sided numbness, facial droop    HPI    54 year old male with h/o bipolar d/o, cubital tunnel syndrome complains of acute onset LUE and LLE weakness, L facial droop, numb/tingling to L lower face/LUE/LLE starting around 11pm while at rest. Onset of symptoms was 11pm. The patient did not awaken with symptoms. Current symptoms include extremity weakness, extremity numbness, facial droop, and inability to speak. Symptoms are currently unchanged. Stroke risk factors:  none known .  Prior stroke history: none. The patient is not on chronic anticoagulation. Associated symptoms: expressive aphasia since getting here. R hand dominant.      Physical Exam     ED Triage Vitals   BP 09/22/24 2327 145/86   Pulse 09/22/24 2327 78   Resp 09/22/24 2327 18   Temp 09/22/24 2354 98.1 °F (36.7 °C)   Temp src 09/22/24 2354 Oral   SpO2 09/22/24 2327 100 %   O2 Device 09/22/24 2327 None (Room air)       Current:/74   Pulse 53   Temp 98.1 °F (36.7 °C) (Oral)   Resp 14   Ht 172.7 cm (5' 8\")   Wt 74.8 kg   SpO2 95%   BMI 25.09 kg/m²   PULSE OX 99% on RA which is normal  GENERAL: alert, appears anxious  HEAD: normocephalic, atraumatic,   EYES: PERRLA, EOMI,  THROAT: mmm dry, no lesions  NECK: supple, no meningeal signs  LUNGS: no resp distress, cta bilateral  CARDIO: RRR without murmur  GI: abdomen is soft and non tender, no masses, nl bowel sounds   EXTREMITIES: no edema,   NEURO:   CRANIAL NERVES: left lower facial droop at rest, on active facial muscle movement pt is able to smile equally   MOTOR: RUE/RLE - 5/5, LUE/LLE - 4/5 states feels heavier to move L side compared to R side   SENSORY:decreased sensation to light touch between LLE and RLE   CEREBELLAR: no pronator drift,  normal finger  nose   VISUAL NEGLECT: none   LANGUAGE: intermittent expressive aphasia   LOC: A&O x3   APPROX NIH: 5  SKIN: good skin turgor, no  rashes  PSYCH: calm, cooperative,    Differential includes:  CVA vs. TIA vs. seizue/post ictal vs. neoplasm vs. tox or metabolic vs. infection    ED Course     Labs Reviewed   COMP METABOLIC PANEL (14) - Abnormal; Notable for the following components:       Result Value    Creatinine 1.58 (*)     BUN/CREA Ratio 9.5 (*)     eGFR-Cr 51 (*)     All other components within normal limits   POCT GLUCOSE - Abnormal; Notable for the following components:    POC Glucose  112 (*)     All other components within normal limits   PROTHROMBIN TIME (PT) - Normal   PTT, ACTIVATED - Normal   TROPONIN I HIGH SENSITIVITY - Normal   MAGNESIUM - Normal   RAPID SARS-COV-2 BY PCR - Normal   CBC WITH DIFFERENTIAL WITH PLATELET   TYPE AND SCREEN   EKG    Rate, intervals and axes as noted on EKG Report.  Rate: 71  Rhythm: Sinus Rhythm  Reading: NSR           MDM       Pulse Ox: 95%, Normal, RA    Cardiac Monitor:   Pulse Readings from Last 1 Encounters:   09/23/24 53   , sinus, normal for rate and rhythm     Prior radiology reviewed: n/a    My independent radiology interpretation: CT Brain - no ICH , defer to radiology read for final report.    Radiology findings:     CT HEAD (without contrast):    Comparison: 9/22/2024     IMPRESSION: No acute intracranial hemorrhage. No evidence of intracranial mass, extra-axial fluid collection, or acute territorial infarct.     Visualized paranasal sinuses and mastoid air cells are clear.    Evidence of residual contrast from prior CTA.  --------------------------------------------------------------------------  CT HEAD (without contrast)  CTA HEAD/[place] OF [place]   CTA NECK     Comparison: XR soft tissue neck 7/12/2024     IMPRESSION:  CT HEAD (without contrast)  No CT evidence of acute large territorial infarction at this time.    MRI could be used for more sensitive  detection of hyperacute/acute infarction if clinically indicated.     No acute intracranial hemorrhage, mass or mass effect, or abnormal extra-axial fluid collections.     Chronic microvascular ischemic changes involving the deep and periventricular white matter.  Cerebral volume loss.   Intracranial atherosclerotic vascular calcification.          CTA HEAD  Patency of the major intracranial arteries.   No evidence of large vessel occlusion or identified aneurysm.  Evaluation for stenoses and peripheral occlusions is somewhat limited by technical factors.  Question stenosis of left anterior cerebral artery A2 segment.     CTA NECK  Patency of the bilateral common carotid arteries, internal carotid arteries, and vertebral arteries.    No evidence of carotid or vertebral artery occlusion, hemodynamically significant stenosis, or dissection.     Degenerative changes of the cervical spine.  ---------------------------------------------------------------    Critical Care: I spent a total of 50 minutes of critical care time in obtaining history, performing a physical exam, bedside monitoring of interventions, collecting and interpreting tests and discussion with consultants but not including time spent performing procedures.      NIH Stroke Scale: 5    http://www.mdcalc.com/nih-stroke-scale-score-nihss/    TPA Discussion: I had a long discussion about the benefits and possible risks of TNK with the patient and his wife at bedside.  Overall decision was made that these are fairly large deficits and that the patient would like to pursue possible therapy.  His CTA came back with read of no large vessel occlusion, not a neurointerventional candidate.      D/w Dr Francisco Duron - agrees with TNK, will consult    Patient treated for CVA, doing well after TNK administration, feels symptoms are improving.  I was called back in the patient room when he started to describe an odd sensation of paresthesias spreading from the left side  across the right frontal area and head with mild frontal headache.  For this reason a repeat CT of the brain was performed to rule out any signs of intracranial hemorrhage and it is normal.  Otherwise patient now has 5/5 muscle strength to bilateral upper and lower extremities.    Disposition and Plan     Clinical Impression:  1. Acute CVA (cerebrovascular accident) (HCC)        Disposition:  Admit    [facility] [place] Hospitalist H&P         CC:       Chief Complaint   Patient presents with    Numbness         PCP: Robinson Lopez MD     History of Present Illness: Mr. Alicea is a 54 year old male with PMH sig for Bipolar DO, ADHD, who presents with left sided facial droop, and left arm and leg weakness.  Patient states around 11:15 last night he developed left sided facial droop and numbness, slurred speech, left arm and leg numbness/weakness.  He immediately came to the hospital, he states prior to this he felt tired, during the weekend, no HA or vision changes, did have a lot of stress this weak, no other complaints.  Denies CP or sob, no fevers, or chills, no HA or vision changes, no nausea or vomiting, states his speech and left sided symptoms are much improved, he continues to have some numbness of his face but other wise improved.           ASSESSMENT / PLAN:   Mr. Alicea is a 54 year old male with PMH sig for Bipolar DO, ADHD, who presents with left sided facial droop, and left arm and leg weakness.     Acute ischemic stroke  Left sided weakness/left sided facial droop  - s/p TNK given in ER  - symptoms improved  - Neurology, pulm consulted  - follow TNK protocol  - no focal weakness now  - needs tele, MRI, ECHO with bubble  - check A1c, Lipids  - further work up per neuro     Bipolar DO  ADHD  - resume lamictal  - hold aldderal     FN:  - IVF:75  - Diet: NPO, adv per neuro/speech     DVT Prophy: scd  Lines: PIV     Dispo: pending clinical course     Outpatient records or previous hospital records  reviewed.      Further recommendations pending patient's clinical course.  [facility] hospitalist to continue to follow patient while in house     Patient and/or patient's family given opportunity to ask questions and note understanding and agreeing with therapeutic plan as outlined     Thank You,  Rhett Carroll MD     Hospitalist with [facility] Service number: [phone]         9/23 PULMONARY:    Date of Admission: 9/22/2024    History of Present Illness: Pt is a 54 year old male consulted for CC management s/p TNK with h/o bipolar diosrder and ADHD. Presented with onset of L facia droop and Larm weakness 2300 on 9/22. CT head negative for bleed and TNK given 0030 9/23. Pt notes severe stress over the last week and notes weakness/malaise yesterday. Was very tired as well. Noted onset of L sided facial droop and weakness prompted ED eval.   Feels improved now but with chest tightness dissimilar to asthma. Notes some R facial tingling.   No antecdedent palpitations or chest pain.   Does admit to snoring and daytime sleepiness.   Does admit to reynauds as well, with swallow difficulty that prededed event and telangietasis.     Assessment/Plan   CVA L sided weakness s/p TNK 9/23/2024  Unclear etiology but has sedentary job and telangietasis.   ?AVM  Echo with bubble study pending.   Neuro following.   Check MEIR and Anti SCL 70, ?CREST syndrome with esophageal issues.   Speech eval.   Tele  Neuro checks  Snoring  Suspect RICKIE  Outpt PSG especially with CVA.   Nutrtion Advance diet.   Proph  SCD. Lovenox when Ok with neuro.   Bipolar   Continue meds.   Dispo  Full code. ICU monitoring.      Critical Care Time greater than: 35 minutes  9/23 NEUROLOGY:    Date of Admission:  9/22/2024  Date of Consult:  9/23/2024  Reason for Consultation:   Stroke     History of Present Illness:   Patient is a 54 year old male who was admitted to the hospital for Acute CVA (cerebrovascular accident) (HCC):     I am seeing   Ryan today in consultation with Dr. Carroll for further evaluation and management of transient left-sided facial droop, and left upper and lower extremity weakness.  He told me that around 11:15 PM last night he developed left-sided facial droop and numbness, his speech did not sound right and he could not find words, and he developed numbness and weakness in left upper left lower and lower extremities.  He has improved after TNK was administered yesterday upon arrival to the emergency department, he continues to have residual numbness on the left side of his body.  CT scan of the head upon arrival to the emergency department did not reveal any acute intracranial abnormalities.  CT angiogram of the head and neck obtained in the emergency department revealed focal high-grade stenosis versus tortuosity of the left anterior cerebral artery at A2.  There were no high-grade stenosis anywhere else.   His past medical history significant for bipolar disorder type 2.   He told me his father had a stroke and  from it at age 60.       Impression:    R/O Acute CVA (cerebrovascular accident) (HCC)     I am not confident he had an ischemic stroke in the right hemisphere.  Some sensory features on his exam today were inconsistent.  He is not weak.   CT angiogram of the head and neck revealed tortuosity/stenosis of the left anterior cerebral artery at the level of A2  MRI brain is still pending-Patient admitted on         Recommendations:  1-MRI brain  2-echocardiogram  3-A1c 5.3  4 LDL- 147  5-chemical DVT prophylaxis  6-aspirin 81 mg 24 hours post TNK  7- PT/OT/ST  8-Atorvastatin 80 mg      Will follow results peripherally        Thank you for allowing me to participate in the care of your patient.     Cindy Jaimes MD          MEDICATIONS ADMINISTERED IN LAST 1 DAY:  acetaminophen (Tylenol) tab 650 mg       Date Action Dose Route User    Discharged on 2024 1443 Given 650 mg Oral Larisa Benítez  RN            Vitals (last day) before discharge       Date/Time Temp Pulse Resp BP SpO2 Weight O2 Device O2 Flow Rate (L/min) [facility]    09/24/24 1400 -- 68 11 134/78 96 % -- -- -- [place]    09/24/24 1300 -- 64 18 125/83 96 % -- -- -- [place]    09/24/24 1200 98.7 °F (37.1 °C) 73 12 138/81 96 % -- -- -- [place]    09/24/24 1100 -- 62 17 115/75 95 % -- -- -- [place]    09/24/24 1000 -- 59 12 124/68 96 % -- -- -- [place]    09/24/24 0900 -- 63 15 122/74 96 % -- -- -- [place]    09/24/24 0800 98.1 °F (36.7 °C) 52 17 103/73 95 % -- -- -- [place]    09/24/24 0700 -- 53 13 115/84 95 % -- -- -- [place]    09/24/24 0600 -- 67 17 118/77 95 % 158 lb 11.7 oz (72 kg) None (Room air) --     09/24/24 0500 -- 52 15 107/70 95 % -- None (Room air) --     09/24/24 0400 98.4 °F (36.9 °C) 52 21 103/73 92 % -- None (Room air) --     09/24/24 0300 -- 69 22 109/67 93 % -- None (Room air) --     09/24/24 0200 -- 57 13 96/68 95 % -- None (Room air) --     09/24/24 0100 -- 56 17 111/72 94 % -- None (Room air) --     09/24/24 0000 97.2 °F (36.2 °C) 59 16 116/76 93 % -- None (Room air) --     09/23/24 2300 -- 64 22 118/79 91 % -- None (Room air) --     09/23/24 2200 -- 63 16 113/80 95 % -- None (Room air) --     09/23/24 2100 -- 62 17 115/81 95 % -- None (Room air) -- VM    09/23/24 2000 98.5 °F (36.9 °C) 59 16 110/69 95 % -- None (Room air) --     09/23/24 1900 -- 75 18 101/44 95 % -- -- -- [place]    09/23/24 1800 -- 55 14 105/72 -- -- -- -- [place]    09/23/24 1600 97.8 °F (36.6 °C) 60 20 114/74 94 % -- -- -- [place]    09/23/24 1500 -- 69 10 120/78 95 % -- -- -- [place]    09/23/24 1400 -- 68 20 124/78 94 % -- -- -- [place]    09/23/24 1300 -- 65 14 115/84 95 % -- -- -- [place]    09/23/24 1200 98.4 °F (36.9 °C) 64 13 118/78 98 % -- -- -- [place]    09/23/24 1100 -- 61 12 119/80 99 % -- -- -- [place]    09/23/24 1000 -- 52 13 103/79 98 % -- Nasal cannula 2 L/min [place]    09/23/24 0900 -- 54 11 114/73 96 % -- -- -- [place]    09/23/24 0800 98.1 °F (36.7 °C) 66 21 102/78 97 % -- None (Room air) -- [place]     09/23/24 0730 -- 62 17 112/67 96 % -- None (Room air) -- TC    09/23/24 0700 -- 53 14 97/66 93 % -- None (Room air) -- TC    09/23/24 0630 -- 52 14 97/71 90 % -- None (Room air) -- TC    09/23/24 0600 -- 49 15 103/71 95 % -- None (Room air) -- TC    09/23/24 0530 -- 51 14 102/74 94 % -- None (Room air) -- TC    09/23/24 0500 -- 52 14 106/66 94 % -- None (Room air) -- TC    09/23/24 0430 -- 51 14 106/74 94 % -- None (Room air) -- TC    09/23/24 0400 -- 54 12 119/75 93 % -- None (Room air) --     09/23/24 0341 -- -- -- -- -- 161 lb 9.6 oz (73.3 kg) -- -- TC    09/23/24 0330 -- 59 15 -- 97 % -- None (Room air) --     09/23/24 0300 97.7 °F (36.5 °C) 59 15 120/71 97 % -- None (Room air) --     09/23/24 0245 -- 53 14 117/74 95 % -- None (Room air) --     09/23/24 0230 -- 55 15 121/78 96 % -- -- --     09/23/24 0215 -- 58 15 132/85 96 % -- -- --     09/23/24 0200 -- 59 11 134/84 97 % -- -- --     09/23/24 0145 -- 57 13 116/76 97 % -- -- --     09/23/24 0130 -- 56 12 115/76 95 % -- -- --     09/23/24 0115 -- -- -- 115/79 -- -- -- --     09/23/24 0100 -- 58 12 117/76 95 % -- -- --     09/23/24 0046 -- -- -- 114/84 -- -- -- --     09/23/24 0045 -- 58 13 114/84 96 % -- -- --     09/23/24 0015 -- 57 10 124/87 97 % -- -- --     09/23/24 0000 -- 68 -- -- -- -- -- --

## 2024-09-26 NOTE — CDS QUERY
.How to answer this Query:     1.) DON'T CLICK COSIGN BUTTON FIRST  2.) Click \"3 dots...\" to the right of cosign button and click EDIT on the toolbar.  2.) Type an \"X\" in the bracket for the diagnosis that applies. (You may also add additional clinical details as you feel necessary to substantiate your response).   3.) Finally click \"Sign\" to complete response.  Thank You      CLINICAL DOCUMENTATION CLARIFICATION   Dear Doctor Carly,  Acute CVA was documented in the medical record. Please clarify the status of this condition at the time of discharge         PLEASE (X)  DIAGNOSIS THAT APPLIES.       (  x )  Acute CVA ruled out, pt had TIA    (   )  Other (please specify):___________________________________         Clinical Indicators: MRI Brain 9/23 - No acute intracranial abnormality. Specifically, no evidence of acute or early subacute infarction   CT Brain 9/24 -No acute intracranial abnormality   Neurology consult 9/23 - R/O Acute CVA   Neurology PN 9/24 - Reviewed MRI brain, no acute strokes   Treatment: Neurology consult, TNK, ASA, Statin     If you have any questions, please contact Clinical :  Chuyita KIM at [phone]     Thank You!    THIS FORM IS A PERMANENT PART OF THE MEDICAL RECORD

## 2024-09-30 ENCOUNTER — PATIENT OUTREACH (OUTPATIENT)
Dept: CASE MANAGEMENT | Age: 54
End: 2024-09-30

## 2024-09-30 NOTE — PROGRESS NOTES
Neuro/Stroke appointment request (discharged 09/24)    DWIGHT Marks  Neurology  [address] [place] [id number]  [phone]  Follow up 1 month  Apt made:  Thu 10/24 @2:40pm & on waitlist  Confirmed w/pt  Closing encounter

## 2024-10-14 ENCOUNTER — HOSPITAL ENCOUNTER (OUTPATIENT)
Age: 54
Discharge: ANOTHER HOSPITAL | End: 2024-10-14
Payer: COMMERCIAL

## 2024-10-14 ENCOUNTER — HOSPITAL ENCOUNTER (EMERGENCY)
Facility: HOSPITAL | Age: 54
Discharge: HOME | End: 2024-10-14
Attending: STUDENT IN AN ORGANIZED HEALTH CARE EDUCATION/TRAINING PROGRAM
Payer: COMMERCIAL

## 2024-10-14 ENCOUNTER — APPOINTMENT (OUTPATIENT)
Dept: MRI IMAGING | Facility: HOSPITAL | Age: 54
End: 2024-10-14
Attending: STUDENT IN AN ORGANIZED HEALTH CARE EDUCATION/TRAINING PROGRAM
Payer: COMMERCIAL

## 2024-10-14 VITALS
OXYGEN SATURATION: 100 % | DIASTOLIC BLOOD PRESSURE: 81 MMHG | SYSTOLIC BLOOD PRESSURE: 132 MMHG | HEART RATE: 58 BPM | TEMPERATURE: 98 F | RESPIRATION RATE: 18 BRPM

## 2024-10-14 VITALS
TEMPERATURE: 97 F | HEART RATE: 51 BPM | RESPIRATION RATE: 17 BRPM | OXYGEN SATURATION: 95 % | DIASTOLIC BLOOD PRESSURE: 78 MMHG | SYSTOLIC BLOOD PRESSURE: 110 MMHG

## 2024-10-14 DIAGNOSIS — R53.1 WEAKNESS GENERALIZED: Primary | ICD-10-CM

## 2024-10-14 DIAGNOSIS — R26.9 GAIT DISTURBANCE: Primary | ICD-10-CM

## 2024-10-14 DIAGNOSIS — R20.2 FACIAL TINGLING: ICD-10-CM

## 2024-10-14 LAB
ALBUMIN SERPL-MCNC: 5.2 G/DL (ref 3.2–4.8)
ALBUMIN/GLOB SERPL: 2.1 {RATIO} (ref 1–2)
ALP LIVER SERPL-CCNC: 61 U/L
ALT SERPL-CCNC: 25 U/L
ANION GAP SERPL CALC-SCNC: 4 MMOL/L (ref 0–18)
AST SERPL-CCNC: 21 U/L (ref ?–34)
ATRIAL RATE: 59 BPM
BASOPHILS # BLD AUTO: 0.03 X10(3) UL (ref 0–0.2)
BASOPHILS NFR BLD AUTO: 0.6 %
BILIRUB SERPL-MCNC: 0.6 MG/DL (ref 0.3–1.2)
BUN BLD-MCNC: 14 MG/DL (ref 9–23)
BUN/CREAT SERPL: 10.6 (ref 10–20)
CALCIUM BLD-MCNC: 10.3 MG/DL (ref 8.7–10.4)
CHLORIDE SERPL-SCNC: 107 MMOL/L (ref 98–112)
CO2 SERPL-SCNC: 32 MMOL/L (ref 21–32)
CREAT BLD-MCNC: 1.32 MG/DL
DEPRECATED RDW RBC AUTO: 40.6 FL (ref 35.1–46.3)
EGFRCR SERPLBLD CKD-EPI 2021: 63 ML/MIN/1.73M2 (ref 60–?)
EOSINOPHIL # BLD AUTO: 0.14 X10(3) UL (ref 0–0.7)
EOSINOPHIL NFR BLD AUTO: 2.9 %
ERYTHROCYTE [DISTWIDTH] IN BLOOD BY AUTOMATED COUNT: 12.1 % (ref 11–15)
GLOBULIN PLAS-MCNC: 2.5 G/DL (ref 2–3.5)
GLUCOSE BLD-MCNC: 111 MG/DL (ref 70–99)
GLUCOSE BLDC GLUCOMTR-MCNC: 101 MG/DL (ref 70–99)
GLUCOSE BLDC GLUCOMTR-MCNC: 115 MG/DL (ref 70–99)
HCT VFR BLD AUTO: 49.4 %
HGB BLD-MCNC: 17.1 G/DL
IMM GRANULOCYTES # BLD AUTO: 0.01 X10(3) UL (ref 0–1)
IMM GRANULOCYTES NFR BLD: 0.2 %
LYMPHOCYTES # BLD AUTO: 1.82 X10(3) UL (ref 1–4)
LYMPHOCYTES NFR BLD AUTO: 37.9 %
MAGNESIUM SERPL-MCNC: 2.1 MG/DL (ref 1.6–2.6)
MCH RBC QN AUTO: 31.7 PG (ref 26–34)
MCHC RBC AUTO-ENTMCNC: 34.6 G/DL (ref 31–37)
MCV RBC AUTO: 91.7 FL
MONOCYTES # BLD AUTO: 0.36 X10(3) UL (ref 0.1–1)
MONOCYTES NFR BLD AUTO: 7.5 %
NEUTROPHILS # BLD AUTO: 2.44 X10 (3) UL (ref 1.5–7.7)
NEUTROPHILS # BLD AUTO: 2.44 X10(3) UL (ref 1.5–7.7)
NEUTROPHILS NFR BLD AUTO: 50.9 %
OSMOLALITY SERPL CALC.SUM OF ELEC: 297 MOSM/KG (ref 275–295)
P AXIS: 19 DEGREES
P-R INTERVAL: 150 MS
PLATELET # BLD AUTO: 197 10(3)UL (ref 150–450)
POTASSIUM SERPL-SCNC: 4.7 MMOL/L (ref 3.5–5.1)
PROT SERPL-MCNC: 7.7 G/DL (ref 5.7–8.2)
Q-T INTERVAL: 406 MS
QRS DURATION: 88 MS
QTC CALCULATION (BEZET): 401 MS
R AXIS: 23 DEGREES
RBC # BLD AUTO: 5.39 X10(6)UL
SODIUM SERPL-SCNC: 143 MMOL/L (ref 136–145)
T AXIS: 44 DEGREES
VENTRICULAR RATE: 59 BPM
WBC # BLD AUTO: 4.8 X10(3) UL (ref 4–11)

## 2024-10-14 PROCEDURE — 82962 GLUCOSE BLOOD TEST: CPT | Performed by: NURSE PRACTITIONER

## 2024-10-14 PROCEDURE — 80053 COMPREHEN METABOLIC PANEL: CPT

## 2024-10-14 PROCEDURE — 70551 MRI BRAIN STEM W/O DYE: CPT | Performed by: STUDENT IN AN ORGANIZED HEALTH CARE EDUCATION/TRAINING PROGRAM

## 2024-10-14 PROCEDURE — 93010 ELECTROCARDIOGRAM REPORT: CPT

## 2024-10-14 PROCEDURE — 83735 ASSAY OF MAGNESIUM: CPT | Performed by: STUDENT IN AN ORGANIZED HEALTH CARE EDUCATION/TRAINING PROGRAM

## 2024-10-14 PROCEDURE — 99285 EMERGENCY DEPT VISIT HI MDM: CPT

## 2024-10-14 PROCEDURE — 36415 COLL VENOUS BLD VENIPUNCTURE: CPT

## 2024-10-14 PROCEDURE — 85025 COMPLETE CBC W/AUTO DIFF WBC: CPT

## 2024-10-14 PROCEDURE — 99205 OFFICE O/P NEW HI 60 MIN: CPT | Performed by: NURSE PRACTITIONER

## 2024-10-14 PROCEDURE — 82962 GLUCOSE BLOOD TEST: CPT

## 2024-10-14 PROCEDURE — 80053 COMPREHEN METABOLIC PANEL: CPT | Performed by: STUDENT IN AN ORGANIZED HEALTH CARE EDUCATION/TRAINING PROGRAM

## 2024-10-14 PROCEDURE — 93005 ELECTROCARDIOGRAM TRACING: CPT

## 2024-10-14 PROCEDURE — 99284 EMERGENCY DEPT VISIT MOD MDM: CPT

## 2024-10-14 PROCEDURE — 85025 COMPLETE CBC W/AUTO DIFF WBC: CPT | Performed by: STUDENT IN AN ORGANIZED HEALTH CARE EDUCATION/TRAINING PROGRAM

## 2024-10-14 RX ORDER — MECLIZINE HYDROCHLORIDE 25 MG/1
25 TABLET ORAL ONCE
Status: COMPLETED | OUTPATIENT
Start: 2024-10-14 | End: 2024-10-14

## 2024-10-14 RX ORDER — MECLIZINE HYDROCHLORIDE 25 MG/1
25 TABLET ORAL 3 TIMES DAILY PRN
Qty: 21 TABLET | Refills: 0 | Status: SHIPPED | OUTPATIENT
Start: 2024-10-14 | End: 2024-10-21

## 2024-10-14 NOTE — ED QUICK NOTES
Ambulance departing facility with patient on stretcher. Facesheet sent with EMS. Patient Aox4 at this time, room air with breathing easy and unlabored.

## 2024-10-14 NOTE — DISCHARGE INSTRUCTIONS
Thank you for seeking care at [facility] Emergency [facility].  You have been seen and evaluated.    We discussed the results of your workup   Please read the instructions provided   If given prescriptions, take as instructed    Remember, your care process does not end after your visit today. Please follow-up with your doctor within 1-2 days for a follow-up check to ensure you are  improving, to see if you need any further evaluation/testing, or to evaluate for any alternate diagnoses.     Please return to the emergency department if you develop chest pain, difficulty breathing, inability to drink liquids without vomiting, one sided numbness or weakness, slurred speech, severe headache, or if you develop any other new or concerning symptoms as these could be signs of more serious medical illness. Please do not drive until cleared by a neurologist or psychiatrist.    We hope you feel better.

## 2024-10-14 NOTE — ED PROVIDER NOTES
[place] Emergency Department Note  Patient: Huber Alicea Age: 54 year old Sex: male      MRN: [id number]  : 1970    Patient Seen in: [facility] Emergency Department    History     Chief Complaint   Patient presents with    Weakness     Stated Complaint:     History obtained from: Patient    Patient is a 54-year-old male with a past medical history of bipolar 2 disorder, anxiety with recent hospitalization for acute onset left-sided weakness and left-sided facial droop concerning for conversion disorder, presenting to the emergency department for evaluation of whole body weakness.     Per EMR review, the patient presented on 2024 with left-sided weakness and left-sided facial droop.  Received TNK in the emergency department for concern for CVA however MRI showed no evidence of acute ischemia.  Patient was evaluated by neurology who noted that symptoms were inconsistent with acute ischemic CVA.  Expressed concern for supratentorial etiology.  Patient was evaluated by psychiatry and diagnosed with conversion disorder.  Was discharged to partial hospitalization program.    Patient states that symptoms from his prior hospitalization have been improving.  He states that 2 days ago, he got in an argument with his wife.  He states that he was frustrated throughout the day yesterday and starting yesterday, began having whole body weakness.  States that he does have both lightheadedness and room spinning dizziness.  He states that his whole body feels heavy.  He denies any numbness.  Denies any vision changes or changes in speech.  He states that his symptoms are similar to his prior presentation however are bilateral today.  He states that he has a follow-up appointment with his neurologist within the next 1 to 2 weeks.  Patient states that he mention the symptoms while at his [facility] program this morning and was sent to the immediate care in the building for further evaluation.  Transferred to the  emergency department for further investigation.    He otherwise denies any headache, fevers, chills, sore throat, shortness of breath, cough, abdominal pain, nausea, vomiting or diarrhea.  Denies any urinary symptoms.    Review of Systems:  Review of Systems  Positive for stated complaint: . Constitutional and vital signs reviewed. All other systems reviewed and negative except as noted above.    Patient History:  Past Medical History:    Asthma (HCC)    childhood    Bipolar 2 disorder (HCC)    Cubital tunnel syndrome    Heart murmur    History of Raynaud's syndrome    Low vitamin D level    profoundly low per patient       Past Surgical History:   Procedure Laterality Date    Other      cubital tunnel release    Other      L index finger gangion removal        Family History   Problem Relation Age of Onset    Stroke Father 67       Specific Social Determinants of Health:   Social History     Socioeconomic History    Marital status:    Tobacco Use    Smoking status: Never    Smokeless tobacco: Never   Substance and Sexual Activity    Alcohol use: Not Currently     Comment: very rare    Drug use: Yes     Types: Cannabis     Comment: very rare 2-3 x's monthly   Social History Narrative     with 2 children 7 and 10.         Social Drivers of Health     Food Insecurity: No Food Insecurity (9/23/2024)    Food Insecurity     Food Insecurity: Never true   Transportation Needs: No Transportation Needs (9/23/2024)    Transportation Needs     Lack of Transportation: No   Housing Stability: Low Risk  (9/23/2024)    Housing Stability     Housing Instability: No           PSFH elements reviewed from today and agreed except as otherwise stated in HPI.    Physical Exam     ED Triage Vitals [10/14/24 1113]   BP (!) 145/93   Pulse 59   Resp 16   Temp 97 °F (36.1 °C)   Temp src Temporal   SpO2 100 %   O2 Device None (Room air)       Current:/78   Pulse 51   Temp 97 °F (36.1 °C) (Temporal)    Resp 17   SpO2 95%         Physical Exam  Constitutional:       Appearance: He is well-developed.   HENT:      Head: Normocephalic and atraumatic.      Right Ear: External ear normal.      Left Ear: External ear normal.      Nose: Nose normal.   Eyes:      Conjunctiva/sclera: Conjunctivae normal.      Pupils: Pupils are equal, round, and reactive to light.   Cardiovascular:      Rate and Rhythm: Normal rate and regular rhythm.      Heart sounds: Normal heart sounds.   Pulmonary:      Effort: Pulmonary effort is normal.      Breath sounds: Normal breath sounds.   Abdominal:      General: Bowel sounds are normal.      Palpations: Abdomen is soft.      Tenderness: There is no abdominal tenderness.   Musculoskeletal:         General: Normal range of motion.      Cervical back: Normal range of motion and neck supple.   Skin:     General: Skin is warm and dry.      Findings: No rash.   Neurological:      General: No focal deficit present.      Mental Status: He is alert and oriented to person, place, and time.      Cranial Nerves: No cranial nerve deficit.      Sensory: No sensory deficit.      Motor: No weakness.      Coordination: Coordination normal.      Gait: Gait normal.      Deep Tendon Reflexes: Reflexes are normal and symmetric.      Comments: Endorses subjective diminished sensation in the left side of the face as well as the right lower face in the V3 distribution   Psychiatric:         Mood and Affect: Mood normal.         Behavior: Behavior normal.         ED Course   Labs:   Labs Reviewed   COMP METABOLIC PANEL (14) - Abnormal; Notable for the following components:       Result Value    Glucose 111 (*)     Creatinine 1.32 (*)     Calculated Osmolality 297 (*)     Albumin 5.2 (*)     A/G Ratio 2.1 (*)     All other components within normal limits   POCT GLUCOSE - Abnormal; Notable for the following components:    POC Glucose  115 (*)     All other components within normal limits   MAGNESIUM - Normal   CBC  WITH DIFFERENTIAL WITH PLATELET   RAINBOW DRAW LAVENDER   RAINBOW DRAW LIGHT GREEN   RAINBOW DRAW BLUE     Radiology findings:  I personally reviewed the images.   MRI BRAIN WO ACUTE (3) SEQUENCE (CPT=70551)    Result Date: 10/14/2024  CONCLUSION:  1. No acute infarct or hemorrhage. 2. Minor nonspecific foci of white matter signal abnormality in both cerebral hemispheres probably reflecting mild chronic small vessel disease.    Dictated by (CST): López Wooten MD on 10/14/2024 at 2:38 PM     Finalized by (CST): López Wooten MD on 10/14/2024 at 2:40 PM           EKG as interpreted by me: Ventricular rate 59, normal sinus bradycardia, normal axis, no parable prolongation, narrow QRS, QTc 401 ms, no ST segment elevations or depressions, no abnormal T wave inversions.  Cardiac Monitor: Interpreted by me.   Pulse Readings from Last 1 Encounters:   10/14/24 51   , sinus,     External non-ED records reviewed independently by me: PHP note reviewed from 10/10/2024.  Patient was started on lamotrigine 400 mg daily for mood stabilization, guanfacine and Adderall for ADHD.    MDM   54-year-old male with a past medical history of bipolar 2 disorder, anxiety, recent diagnosis of conversion disorder presenting today for evaluation of whole body heaviness.  On exam, he is no obvious focal neurologic deficits.  No aphasia.  No dysarthria.  No visual changes.  Endorses subjective diminished sensation in the left side of his face as well as the right V3 distribution.  States that the symptoms been ongoing for the past 2 days.    Differential diagnoses considered includes, but is not limited to: Conversion disorder, electrolyte or metabolic derangement, acute CVA    Will obtain the following tests: CBC, CMP, magnesium, MRI brain  Please see ED course for my independent review of these tests/imaging results.    Initial Medications/Therapeutics administered: Antivert    Chronic conditions affecting care: Bipolar 2 disorder, anxiety,  recent diagnosis of conversion disorder         ED course: Labs overall reassuring.  Creatinine consistent with patient's baseline.  I independently reviewed the MRI images that show no evidence of acute CVA.  Agree with radiology read above.  Upon reevaluation, noted to have mild improvement of symptoms.  I strongly suspect that his symptoms are likely psychiatric in etiology.  His symptoms are not consistent with acute CVA and MRI is otherwise normal.  No evidence of infectious etiology.  No evidence of dehydration or other metabolic derangement.  Discussed continuation of PHP program and continued outpatient neurologic follow-up.  Return precautions were provided and all questions were answered.  Patient expressed understanding and agreement with this plan.  Ambulated independently out of the emergency department with steady gait.      Disposition and Plan     Clinical Impression:  1. Weakness generalized        Disposition:  Discharge    Follow-up:  Robinson Torres MD  [address] [place] [address]  [address]  [place] [id number]  [phone]    Schedule an appointment as soon as possible for a visit in 2 day(s)  As needed, If symptoms worsen    Robyn Gill, DO  [address] [facility] [address]  [place] [id number]  [phone]    Schedule an appointment as soon as possible for a visit in 1 week(s)  Follow up with [facility] as previously scheduled in the next week or two      Medications Prescribed:  Discharge Medication List as of 10/14/2024  3:19 PM        START taking these medications    Details   meclizine 25 MG Oral Tab Take 1 tablet (25 mg total) by mouth 3 (three) times daily as needed., Normal, Disp-21 tablet, R-0               This note may have been created using voice dictation technology and may include inadvertent errors.      Caitlyn Luna MD  Emergency Medicine

## 2024-10-14 NOTE — ED INITIAL ASSESSMENT (HPI)
Patient comes in to IC reporting feeling more fatigued than usual today with right sided facial numbness, aphasia, and feeling more disoriented. Patient had a TIA 9/22 and has lasting left sided facial droop but states he started to have new symptoms a few days after hospitalization.

## 2024-10-14 NOTE — ED INITIAL ASSESSMENT (HPI)
Pt to ED via EMS w/ c/o heaviness and weakness in all four extremities, and left sided head pain. Denies any dizziness or blurred vision. Per EMS, pt had a TIA on the 22nd of last month and was in the hospital for 2 days.   BEFAST is negative.   Blood sugar of 115 upon arrival to ED.

## 2024-10-14 NOTE — ED PROVIDER NOTES
Patient Seen in: [facility] [place]    History   CC: weakness, facial tingling   HPI: Huber Alicea 54 year old male w/ CVA 3wks ago admitted to [facility], [facility] who presents c/o right sided facial tingling, generalized body fatigue and weakness, dizziness, difficulty finding words, gait disturbance x2 days. States 3 wks ago on 9/22 he was admitted to the ICU for TIA s/s including left sided facial droop and had MRI and CT which were clear. States s/s were getting better until two days ago. Denies cp, alysha, fever, URI s/s, gi s/s. Denies new s/s this morning, rather states he was already in this building for PHP program d/t difficulty managing his manic episodes and came down to  for eval d/t s/s present in the last 2 days.     Past Medical History:    Asthma (HCC)    childhood    Bipolar 2 disorder (HCC)    Cubital tunnel syndrome    Heart murmur    History of Raynaud's syndrome    Low vitamin D level    profoundly low per patient       Past Surgical History:   Procedure Laterality Date    Other      cubital tunnel release    Other      L index finger gangion removal       Family History   Problem Relation Age of Onset    Stroke Father 67       Social History     Socioeconomic History    Marital status:    Tobacco Use    Smoking status: Never    Smokeless tobacco: Never   Substance and Sexual Activity    Alcohol use: Not Currently     Comment: very rare    Drug use: Yes     Types: Cannabis     Comment: very rare 2-3 x's monthly   Social History Narrative     with 2 children 7 and 10.         Social Drivers of Health     Food Insecurity: No Food Insecurity (9/23/2024)    Food Insecurity     Food Insecurity: Never true   Transportation Needs: No Transportation Needs (9/23/2024)    Transportation Needs     Lack of Transportation: No   Housing Stability: Low Risk  (9/23/2024)    Housing Stability     Housing Instability: No       ROS:  Systems reviewed: All pertinent positives  noted in HPI. Unless otherwise noted, additional systems reviewed are negative.   Vital signs reviewed.    Positive for stated complaint: Numbness in face  Other systems are as noted in HPI.  Constitutional and vital signs reviewed.      All other systems reviewed and negative except as noted above.    PSFH elements reviewed from today and agreed except as otherwise stated in HPI.             Constitutional and vital signs reviewed.        Physical Exam     ED Triage Vitals [10/14/24 1023]   /81   Pulse 58   Resp 18   Temp 97.6 °F (36.4 °C)   Temp src Temporal   SpO2 100 %   O2 Device None (Room air)       Current:/81   Pulse 58   Temp 97.6 °F (36.4 °C) (Temporal)   Resp 18   SpO2 100%         PE:  General - Appears well, non-toxic and in NAD  Head - Appears symmetrical without deformity/swelling cranium, scalp, or facial bones  Eyes - PERRLA, sclera not injected, no discharge noted, no periorbital edema, no pain/difficulty with EOM  ENT - EAC bilaterally without discharge, TM pearly grey with COL visualized appropriately bilaterally.   no nasal drainage noted in nares bilat, no cobblestoning to post. Pharynx.   Oropharynx clear, posterior pharynx is without erythema and without tonsilar enlargement or exudate, uvula midline, +gag, voice is clear. No trismus  Neck - supple with trachea midline  Resp - Lung sounds clear bilaterally and wob unlabored, good aeration with equal, even expansion bilaterally   CV - RRR  Skin - no rashes or petechiae noted, pink warm and dry throughout, mmm, cap refill <2seconds  Neuro - A&O x4, came into immediate care in a wheelchair.  Hand grasp equal bilaterally.  No arm drift on exam.  Smile symmetrical.  MSK - makes purposeful movements of all extremities  Psych - Interactive and appropriate      ED Course     Labs Reviewed   POCT GLUCOSE - Abnormal; Notable for the following components:       Result Value    POC Glucose  101 (*)     All other components within normal  limits       MDM     DDx: CVA versus TIA versus migraine versus panic disorder    Accu-Chek 101.  Neuroexam normal.  Vitals stable.  Patient with documented CVA for which she was admitted on chart review to [facility], discharged 9/24.  Advised further evaluation/management in the emergency room at this time for diagnostics unavailable at this facility.  Patient is agreeable and does not feel safe driving there.  Ambulance called.  Patient is historian and demonstrates understanding of all instruction and agrees with plan of care.      Disposition and Plan     Clinical Impression:  1. Gait disturbance    2. Facial tingling        Disposition:  Ic to ed    Follow-up:  No follow-up provider specified.    Medications Prescribed:  Discharge Medication List as of 10/14/2024 10:43 AM

## 2024-10-24 ENCOUNTER — OFFICE VISIT (OUTPATIENT)
Dept: NEUROLOGY | Facility: CLINIC | Age: 54
End: 2024-10-24
Payer: COMMERCIAL

## 2024-10-24 VITALS
WEIGHT: 162 LBS | BODY MASS INDEX: 24.55 KG/M2 | HEART RATE: 68 BPM | SYSTOLIC BLOOD PRESSURE: 121 MMHG | DIASTOLIC BLOOD PRESSURE: 76 MMHG | HEIGHT: 68 IN

## 2024-10-24 DIAGNOSIS — R20.0 NUMBNESS AND TINGLING: Primary | ICD-10-CM

## 2024-10-24 DIAGNOSIS — R20.2 NUMBNESS AND TINGLING: Primary | ICD-10-CM

## 2024-10-24 PROCEDURE — 3078F DIAST BP <80 MM HG: CPT | Performed by: INTERNAL MEDICINE

## 2024-10-24 PROCEDURE — 3008F BODY MASS INDEX DOCD: CPT | Performed by: INTERNAL MEDICINE

## 2024-10-24 PROCEDURE — 3074F SYST BP LT 130 MM HG: CPT | Performed by: INTERNAL MEDICINE

## 2024-10-24 PROCEDURE — 99204 OFFICE O/P NEW MOD 45 MIN: CPT | Performed by: INTERNAL MEDICINE

## 2024-10-24 RX ORDER — AZITHROMYCIN 250 MG/1
TABLET, FILM COATED ORAL
COMMUNITY

## 2024-10-24 RX ORDER — NAPROXEN 500 MG/1
TABLET ORAL
COMMUNITY

## 2024-10-24 RX ORDER — MECLIZINE HYDROCHLORIDE 25 MG/1
1 TABLET ORAL 3 TIMES DAILY PRN
COMMUNITY
Start: 2024-07-07

## 2024-10-24 NOTE — PROGRESS NOTES
[facility] [address], [facility] [address]  [facility] [address]  [phone]            Neurology Initial Visit     Referred By: Dr. Moss ref. provider found    Chief Complaint:   Chief Complaint   Patient presents with    TIA     Patient presents here today to establish care, patient was referred by ED 9/22/24 and 10/14/24 to evaluate and treat TIA, patient c/o  Weakness in all four extremities .Patient states he still had numbness on left shoulder. Headache on left side of the head.Patient states he has trouble finish sentences and mispronouncing the end of the word. CT and MRI on file.        HPI:     Huber Alicea is a 54 year old male with history of asthma, hypertriglyceridemia, bipolar, anxiety, who presents for posthospital follow-up of episodes of numbness and weakness.  On 9/22/2024, he developed left-sided facial droop, and left-sided weakness.  Also speech did not sound right, and he had numbness in the left side as well.  Came to ED and was given TNK.  Symptoms improved but had residual numbness on the left side after TNK.  Discharged home, but then presented on 10/14 with right sided facial tingling, generalized fatigue, weakness, word finding difficulty for 2 days.  MRI was done again and was negative.  Symptoms resolved within a few hours.  Since then, he has had some on and off symptoms.  Mainly involving numbness over the left side of the face, sometimes losing words at the end of a sentence, and sometimes tripping on his feet when he is walking.  Also having insomnia.  He is taking aspirin on a daily basis.    Past Medical History:    Asthma (HCC)    childhood    Bipolar 2 disorder (HCC)    Cubital tunnel syndrome    Heart murmur    History of Raynaud's syndrome    Low vitamin D level    profoundly low per patient       Past Surgical History:   Procedure Laterality Date    Other      cubital tunnel release    Other      L index finger gangion removal        Social history:  History   Smoking Status    Never   Smokeless Tobacco    Never     History   Alcohol Use Not Currently     Comment: very rare     History   Drug Use    Types: Cannabis     Comment: very rare 2-3 x's monthly       Family History   Problem Relation Age of Onset    Stroke Father 67         Current Outpatient Medications:     meclizine 25 MG Oral Tab, Take 1 tablet (25 mg total) by mouth 3 (three) times daily as needed., Disp: , Rfl:     lamoTRIgine 200 MG Oral Tab, Take 2 tablets (400 mg total) by mouth daily., Disp: 60 tablet, Rfl: 0    guanFACINE HCl ER 4 MG Oral Tablet 24 Hr, Take 1 tablet (4 mg total) by mouth daily., Disp: 30 tablet, Rfl: 0    guanFACINE ER 2 MG Oral Tablet 24 Hr, Take 1 tablet (2 mg total) by mouth daily., Disp: 30 tablet, Rfl: 0    aspirin 81 MG Oral Tab EC, Take 1 tablet (81 mg total) by mouth daily., Disp: 30 tablet, Rfl: 0    QUEtiapine (SEROQUEL) 25 MG Oral Tab, Take 1 tablet (25 mg total) by mouth nightly as needed. (Patient not taking: Reported on 10/24/2024), Disp: 30 tablet, Rfl: 0    naproxen 500 MG Oral Tab, , Disp: , Rfl:     azithromycin 250 MG Oral Tab, , Disp: , Rfl:     amphetamine-dextroamphetamine 15 MG Oral Tab, Take 1 tablet (15 mg total) by mouth daily. (Patient not taking: Reported on 10/24/2024), Disp: , Rfl:     Allergies[1]    ROS:   As in HPI, the rest of the 14 system review was done and was negative      Physical Exam:  Vitals:    10/24/24 1456   BP: 121/76   Pulse: 68   Weight: 162 lb (73.5 kg)   Height: 68\"       General: No apparent distress, well nourished, well groomed.  Head- Normocephalic, atraumatic  Eyes- No redness or swelling    Neurological:   Mental Status:  Mental Status- Alert, conversant, speech fluent, following all commands, Fund of knowledge appropriate for education and age, and Thought process intact    Cranial Nerves:  II.- Visual fields full to confrontation,       Fundoscopic Exam- Sharp optic discs, no pallor, III, IV,  VI- EOM intact, V.  Decreased pinprick over the left side of face and splits mid line in forehead, VII. Face symmetric, no facial weakness    Motor Exam:  Strength- upper extremities 5/5 proximally and distally                - lower  extremities 5/5 proximally and distally    Sensory Exam:  Pinprick-decreased over left side of face, otherwise intact in all 4 extremities  Vibration- intact in all 4 extremities, splits midline in forehead    Deep Tendon Reflexes:  Biceps 2+ bilateral symmetric  Triceps 2+ bilateral symmetric  Brachioradialis 2 + bilateral symmetric  Patellar 2+ bilateral symmetric  Ankle jerks 2+ bilateral symmetric    Coordination:  No dysmetria with finger to nose bilaterally    Gait:  Normal casual gait    Labs:    Lab Results   Component Value Date    TSH 0.881 12/21/2021     Lab Results   Component Value Date    HDL 55 09/22/2024     (H) 09/22/2024    TRIG 94 09/22/2024     Lab Results   Component Value Date    HGB 17.1 10/14/2024    HCT 49.4 10/14/2024    MCV 91.7 10/14/2024    WBC 4.8 10/14/2024    .0 10/14/2024      Lab Results   Component Value Date    BUN 14 10/14/2024    CA 10.3 10/14/2024    ALT 25 10/14/2024    AST 21 10/14/2024    ALB 5.2 (H) 10/14/2024     10/14/2024    K 4.7 10/14/2024     10/14/2024    CO2 32.0 10/14/2024      I have reviewed labs.  A1c 5.3    Imaging Studies:  I have independently reviewed imaging.  CT angio shows focal high-grade stenosis which is tortuous segment of the left LADARIUS A2 segment    MRI brain on 9/23 and 10/14 normal, no acute stroke, minimal white matter changes      Assessment   Huber CHRISS Alicea is a 54 year old male with history of asthma, hypertriglyceridemia, bipolar, anxiety, who presents for posthospital follow-up of episodes of numbness and weakness.  Doing better over the past few weeks.  Has some residual symptoms which are mild    1. Numbness and tingling  -We discussed that symptoms are likely related to functional  neurologic disorder.  He is already improving and since symptoms are mild we can observe for now.  If symptoms worsen, told to call me to discuss so that we can decide if further testing is needed or whether physical/speech therapy is more appropriate  -In the meantime continue aspirin 81 mg daily  -Cholesterol is mildly elevated, work on diet and lifestyle and recheck with PCP  -Since no stroke occurred on MRI and symptoms are likely functional, do not feel that PFO closure is indicated       Education and counseling provided to patient. Instructed patient to call my office or seek medical attention immediately if symptoms worsen.  Patient verbalized understanding of information given. All questions were answered. All side effects of drugs were discussed.     I have spent 51 min total time on the day of the encounter, including: Total time spent reasons:  Preparing to see the patient, Obtaining and/or reviewing separately obtained history, Performing a medically appropriate examination and/or evaluation, Counseling and educating the patient/family/caregiver, Ordering medications, tests, or procedures, Documenting clinical information in Epic, and Independently interpreting results and communicating results to the patient/family/caregiver      Return to clinic in: No follow-ups on file.    Candace Mendieta MD         [1]   Allergies  Allergen Reactions    Atorvastatin MYALGIA    Mold OTHER (SEE COMMENTS)    Penicillins OTHER (SEE COMMENTS)     Told by mother

## (undated) NOTE — LETTER
Date & Time: 10/14/2024, 3:17 PM  Patient: Huber Alicea  Encounter Provider(s):    Caitlyn Luna MD       To Whom It May Concern:    Huber Alicea was seen and treated in our department on 10/14/2024. He can return to his PHP program. Please do not drive until cleared by a neurologist.    If you have any questions or concerns, please do not hesitate to call.        _____________________________  Physician/APC Signature